# Patient Record
Sex: FEMALE | Race: WHITE | NOT HISPANIC OR LATINO | Employment: FULL TIME | URBAN - METROPOLITAN AREA
[De-identification: names, ages, dates, MRNs, and addresses within clinical notes are randomized per-mention and may not be internally consistent; named-entity substitution may affect disease eponyms.]

---

## 2017-08-26 ENCOUNTER — OFFICE VISIT (OUTPATIENT)
Dept: URGENT CARE | Facility: CLINIC | Age: 32
End: 2017-08-26
Payer: COMMERCIAL

## 2017-08-26 PROCEDURE — 12001 RPR S/N/AX/GEN/TRNK 2.5CM/<: CPT

## 2017-08-26 PROCEDURE — 99203 OFFICE O/P NEW LOW 30 MIN: CPT

## 2019-06-20 ENCOUNTER — OFFICE VISIT (OUTPATIENT)
Dept: URGENT CARE | Facility: CLINIC | Age: 34
End: 2019-06-20
Payer: COMMERCIAL

## 2019-06-20 VITALS
TEMPERATURE: 98.6 F | WEIGHT: 150 LBS | RESPIRATION RATE: 16 BRPM | OXYGEN SATURATION: 99 % | BODY MASS INDEX: 23.54 KG/M2 | HEART RATE: 84 BPM | HEIGHT: 67 IN | SYSTOLIC BLOOD PRESSURE: 108 MMHG | DIASTOLIC BLOOD PRESSURE: 64 MMHG

## 2019-06-20 DIAGNOSIS — L03.031 PARONYCHIA OF GREAT TOE OF RIGHT FOOT: Primary | ICD-10-CM

## 2019-06-20 PROCEDURE — 99213 OFFICE O/P EST LOW 20 MIN: CPT | Performed by: FAMILY MEDICINE

## 2019-06-20 RX ORDER — CEFADROXIL 500 MG/1
500 CAPSULE ORAL EVERY 12 HOURS SCHEDULED
Qty: 20 CAPSULE | Refills: 0 | Status: SHIPPED | OUTPATIENT
Start: 2019-06-20 | End: 2019-06-30

## 2019-06-20 RX ORDER — DESOGESTREL AND ETHINYL ESTRADIOL 21-5 (28)
KIT ORAL
Refills: 0 | COMMUNITY
Start: 2019-05-12 | End: 2020-11-23

## 2019-08-13 ENCOUNTER — OFFICE VISIT (OUTPATIENT)
Dept: URGENT CARE | Facility: CLINIC | Age: 34
End: 2019-08-13
Payer: COMMERCIAL

## 2019-08-13 VITALS
OXYGEN SATURATION: 96 % | SYSTOLIC BLOOD PRESSURE: 122 MMHG | RESPIRATION RATE: 20 BRPM | BODY MASS INDEX: 23.92 KG/M2 | DIASTOLIC BLOOD PRESSURE: 76 MMHG | HEART RATE: 85 BPM | TEMPERATURE: 96.6 F | HEIGHT: 67 IN | WEIGHT: 152.4 LBS

## 2019-08-13 DIAGNOSIS — L23.7 POISON IVY: Primary | ICD-10-CM

## 2019-08-13 PROCEDURE — 99213 OFFICE O/P EST LOW 20 MIN: CPT | Performed by: PREVENTIVE MEDICINE

## 2019-08-13 RX ORDER — METHYLPREDNISOLONE 4 MG/1
TABLET ORAL
Qty: 1 EACH | Refills: 0 | Status: SHIPPED | OUTPATIENT
Start: 2019-08-13 | End: 2020-11-23

## 2019-08-13 NOTE — PROGRESS NOTES
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 and now has developed a papular vesicular oozing rashes on both wrists with small papules on the trunk      Review of Systems   Review of Systems   Skin: Positive for rash  Current Medications       Current Outpatient Medications:     desogestrel-ethinyl estradiol (KARIVA) 0 15-0 02/0 01 MG () per tablet, , Disp: , Rfl: 0    Current Allergies     Allergies as of 2019    (No Known Allergies)            The following portions of the patient's history were reviewed and updated as appropriate: allergies, current medications, past family history, past medical history, past social history, past surgical history and problem list      Past Medical History:   Diagnosis Date    Patient denies medical problems        Past Surgical History:   Procedure Laterality Date    SUPERFICIAL LYMPH NODE BIOPSY / EXCISION      TONSILLECTOMY         No family history on file  Medications have been verified  Objective   /76   Pulse 85   Temp (!) 96 6 °F (35 9 °C)   Resp 20   Ht 5' 7" (1 702 m)   Wt 69 1 kg (152 lb 6 4 oz)   SpO2 96%   BMI 23 87 kg/m²        Physical Exam     Physical Exam   Skin:   Both anterior wrist have erythematous papular vesicular rashes on them that are oozing    Small erythematous papules seen on the trunk

## 2020-02-03 ENCOUNTER — OFFICE VISIT (OUTPATIENT)
Dept: URGENT CARE | Facility: CLINIC | Age: 35
End: 2020-02-03
Payer: COMMERCIAL

## 2020-02-03 VITALS
TEMPERATURE: 97.4 F | WEIGHT: 156 LBS | DIASTOLIC BLOOD PRESSURE: 90 MMHG | RESPIRATION RATE: 20 BRPM | SYSTOLIC BLOOD PRESSURE: 124 MMHG | OXYGEN SATURATION: 96 % | BODY MASS INDEX: 25.07 KG/M2 | HEIGHT: 66 IN | HEART RATE: 95 BPM

## 2020-02-03 DIAGNOSIS — J20.9 ACUTE BRONCHITIS, UNSPECIFIED ORGANISM: Primary | ICD-10-CM

## 2020-02-03 PROCEDURE — 99213 OFFICE O/P EST LOW 20 MIN: CPT | Performed by: FAMILY MEDICINE

## 2020-02-03 RX ORDER — BENZONATATE 100 MG/1
100 CAPSULE ORAL 3 TIMES DAILY PRN
Qty: 20 CAPSULE | Refills: 0 | Status: SHIPPED | OUTPATIENT
Start: 2020-02-03 | End: 2021-01-20 | Stop reason: ALTCHOICE

## 2020-02-03 RX ORDER — AZITHROMYCIN 250 MG/1
TABLET, FILM COATED ORAL
Qty: 6 TABLET | Refills: 0 | Status: SHIPPED | OUTPATIENT
Start: 2020-02-03 | End: 2020-02-07

## 2020-02-03 NOTE — PATIENT INSTRUCTIONS
F/u as needed  Most likely viral   Supportive care  OTC meds as needed  F/u with PCP if no improvement  Symptoms for 2 wks- will try Zpack  Tessalon as needed

## 2020-02-03 NOTE — PROGRESS NOTES
NAME: Hesham Lowe is a 36 y o  female  : 1985    MRN: [id number]      Assessment and Plan   Acute bronchitis, unspecified organism [J20 9]  1  Acute bronchitis, unspecified organism  azithromycin (ZITHROMAX) 250 mg tablet    benzonatate (TESSALON PERLES) 100 mg capsule           Patient Instructions   Patient Instructions   F/u as needed  Most likely viral   Supportive care  OTC meds as needed  F/u with PCP if no improvement  Symptoms for 2 wks- will try Zpack  Tessalon as needed  Proceed to ER if symptoms worsen  Chief Complaint     Chief Complaint   Patient presents with    Cough     patient reports x 2 weeks for 2 days had a sore throat, followed by a productive cough  Denies sore throat at this time  History of Present Illness   Here c/o cough  No fevers  2 wks  SOB with coughing  Recently traveled to [facility] returned on   Theraflu, dayquil, nyquil, mucinex  Tylenol cold/flu  No Tb contacts  Review of Systems   Review of Systems   Constitutional: Negative for fatigue and fever  HENT: Negative for ear pain and trouble swallowing  Respiratory: Positive for cough and shortness of breath  Negative for chest tightness  Cardiovascular: Negative for chest pain  Gastrointestinal: Negative for abdominal pain, diarrhea, nausea and vomiting  Genitourinary: Negative for difficulty urinating and dysuria  Skin: Negative for rash and wound  Neurological: Positive for headaches  Negative for weakness           Current Medications       Current Outpatient Medications:     azithromycin (ZITHROMAX) 250 mg tablet, Take 2 tablets today then 1 tablet daily x 4 days, Disp: 6 tablet, Rfl: 0    benzonatate (TESSALON PERLES) 100 mg capsule, Take 1 capsule (100 mg total) by mouth 3 (three) times a day as needed for cough, Disp: 20 capsule, Rfl: 0    desogestrel-ethinyl estradiol (KARIVA) 0 15-0 02/0 01 MG (21/5) per tablet, , Disp: , Rfl: 0    methylPREDNISolone 4 MG tablet therapy pack, Use as directed on package (Patient not taking: Reported on 2/3/2020), Disp: 1 each, Rfl: 0    Current Allergies     Allergies as of 02/03/2020    (No Known Allergies)              Past Medical History:   Diagnosis Date    Patient denies medical problems        Past Surgical History:   Procedure Laterality Date    SUPERFICIAL LYMPH NODE BIOPSY / EXCISION      TONSILLECTOMY         History reviewed  No pertinent family history  Medications have been verified  The following portions of the patient's history were reviewed and updated as appropriate: allergies, current medications, past family history, past medical history, past social history, past surgical history and problem list     Objective   /90   Pulse 95   Temp (!) 97 4 °F (36 3 °C)   Resp 20   Ht 5' 6" (1 676 m)   Wt 70 8 kg (156 lb)   LMP 01/12/2020   SpO2 96%   BMI 25 18 kg/m²      Physical Exam     Physical Exam   Constitutional: She is oriented to person, place, and time  She appears well-developed and well-nourished  HENT:   Head: Normocephalic  Eyes: EOM are normal    Neck: Normal range of motion  Cardiovascular: Normal rate, regular rhythm and normal heart sounds  Exam reveals no gallop and no friction rub  No murmur heard  Pulmonary/Chest: Effort normal and breath sounds normal  No respiratory distress  She has no wheezes  She has no rales  Abdominal: Soft  Bowel sounds are normal  She exhibits no distension  There is no tenderness  There is no rebound and no guarding  Musculoskeletal: Normal range of motion  Neurological: She is oriented to person, place, and time  Skin: Skin is warm and dry  No rash noted  Psychiatric: She has a normal mood and affect  Thought content normal    Nursing note and vitals reviewed

## 2020-11-06 LAB
EXTERNAL HIV SCREEN: NORMAL
HCV AB SER-ACNC: NON REACTIVE

## 2020-11-09 ENCOUNTER — TRANSCRIBE ORDERS (OUTPATIENT)
Dept: PERINATAL CARE | Facility: CLINIC | Age: 35
End: 2020-11-09

## 2020-11-09 DIAGNOSIS — O09.899 SUPERVISION OF OTHER HIGH RISK PREGNANCIES, UNSPECIFIED TRIMESTER: Primary | ICD-10-CM

## 2020-11-20 ENCOUNTER — TELEPHONE (OUTPATIENT)
Dept: PERINATAL CARE | Facility: OTHER | Age: 35
End: 2020-11-20

## 2020-11-23 ENCOUNTER — ROUTINE PRENATAL (OUTPATIENT)
Dept: PERINATAL CARE | Facility: OTHER | Age: 35
End: 2020-11-23
Payer: COMMERCIAL

## 2020-11-23 VITALS
WEIGHT: 165 LBS | DIASTOLIC BLOOD PRESSURE: 76 MMHG | HEART RATE: 84 BPM | SYSTOLIC BLOOD PRESSURE: 130 MMHG | HEIGHT: 66 IN | BODY MASS INDEX: 26.52 KG/M2

## 2020-11-23 DIAGNOSIS — Z3A.12 12 WEEKS GESTATION OF PREGNANCY: Primary | ICD-10-CM

## 2020-11-23 DIAGNOSIS — Z36.82 ENCOUNTER FOR (NT) NUCHAL TRANSLUCENCY SCAN: ICD-10-CM

## 2020-11-23 DIAGNOSIS — O34.10 UTERINE FIBROID IN PREGNANCY: ICD-10-CM

## 2020-11-23 DIAGNOSIS — D25.9 UTERINE FIBROID IN PREGNANCY: ICD-10-CM

## 2020-11-23 DIAGNOSIS — O09.899 SUPERVISION OF OTHER HIGH RISK PREGNANCIES, UNSPECIFIED TRIMESTER: ICD-10-CM

## 2020-11-23 DIAGNOSIS — O09.511 PRIMIGRAVIDA OF ADVANCED MATERNAL AGE IN FIRST TRIMESTER: ICD-10-CM

## 2020-11-23 PROBLEM — O09.519 ADVANCED MATERNAL AGE, 1ST PREGNANCY: Status: ACTIVE | Noted: 2020-11-23

## 2020-11-23 PROCEDURE — 76813 OB US NUCHAL MEAS 1 GEST: CPT | Performed by: OBSTETRICS & GYNECOLOGY

## 2020-11-23 PROCEDURE — 99241 PR OFFICE CONSULTATION NEW/ESTAB PATIENT 15 MIN: CPT | Performed by: OBSTETRICS & GYNECOLOGY

## 2020-11-23 PROCEDURE — 76801 OB US < 14 WKS SINGLE FETUS: CPT | Performed by: OBSTETRICS & GYNECOLOGY

## 2020-11-23 RX ORDER — ASPIRIN 81 MG/1
162 TABLET ORAL DAILY
Qty: 60 TABLET | Refills: 5 | Status: SHIPPED | OUTPATIENT
Start: 2020-11-23

## 2021-01-19 ENCOUNTER — TELEPHONE (OUTPATIENT)
Dept: PERINATAL CARE | Facility: CLINIC | Age: 36
End: 2021-01-19

## 2021-01-19 NOTE — TELEPHONE ENCOUNTER
-------------------------------------------------------------    Attempted to reach patient by phone and left voicemail to confirm appointment for MFM ultrasound  1 support person ( must be over the age of 15) may accompany you for your appointment  If you or your support person have traveled outside the state in the past 2 weeks, please call and notify our office today #[phone]  You and your support person must wear a mask ,covering nose and mouth,during your entire visit  To minimize your exposure in our waiting room, please call our office prior to entering the building  Check in and rooming questions will be done via phone  We will give you directions when to enter for your appointment  [place]: [phone]    IF you are not feeling well- cough, fever, shortness of breath or any flu like symptoms, contact your primary care physician or [address] [place] [address]  If you are awaiting COVID 19 test results please call and reschedule your appointment    Any questions with these instructions please call Maternal Fetal Medicine nurse line today @ # [phone]

## 2021-01-20 ENCOUNTER — ROUTINE PRENATAL (OUTPATIENT)
Dept: PERINATAL CARE | Facility: CLINIC | Age: 36
End: 2021-01-20
Payer: COMMERCIAL

## 2021-01-20 VITALS
WEIGHT: 174.6 LBS | BODY MASS INDEX: 28.06 KG/M2 | HEIGHT: 66 IN | HEART RATE: 62 BPM | SYSTOLIC BLOOD PRESSURE: 122 MMHG | DIASTOLIC BLOOD PRESSURE: 66 MMHG

## 2021-01-20 DIAGNOSIS — O34.12 UTERINE FIBROIDS AFFECTING PREGNANCY IN SECOND TRIMESTER: ICD-10-CM

## 2021-01-20 DIAGNOSIS — Z3A.20 20 WEEKS GESTATION OF PREGNANCY: ICD-10-CM

## 2021-01-20 DIAGNOSIS — D25.9 UTERINE FIBROIDS AFFECTING PREGNANCY IN SECOND TRIMESTER: ICD-10-CM

## 2021-01-20 DIAGNOSIS — O35.8XX0 FETAL RENAL ANOMALY, SINGLE GESTATION: ICD-10-CM

## 2021-01-20 DIAGNOSIS — O09.511 ELDERLY PRIMIGRAVIDA, FIRST TRIMESTER: Primary | ICD-10-CM

## 2021-01-20 DIAGNOSIS — Z36.86 ENCOUNTER FOR ANTENATAL SCREENING FOR CERVICAL LENGTH: ICD-10-CM

## 2021-01-20 PROBLEM — O35.EXX0 FETAL RENAL ANOMALY, SINGLE GESTATION: Status: ACTIVE | Noted: 2021-01-20

## 2021-01-20 PROCEDURE — 76817 TRANSVAGINAL US OBSTETRIC: CPT | Performed by: OBSTETRICS & GYNECOLOGY

## 2021-01-20 PROCEDURE — 76811 OB US DETAILED SNGL FETUS: CPT | Performed by: OBSTETRICS & GYNECOLOGY

## 2021-01-20 PROCEDURE — 99213 OFFICE O/P EST LOW 20 MIN: CPT | Performed by: OBSTETRICS & GYNECOLOGY

## 2021-01-20 NOTE — PROGRESS NOTES
Please refer to the [facility] ultrasound report in Ob Procedures for additional information regarding today's visit

## 2021-01-20 NOTE — PROGRESS NOTES
Ultrasound Probe Disinfection    A transvaginal ultrasound was performed  Prior to use, disinfection was performed with High Level Disinfection Process (Trophon)  Probe serial number [id number]: [id number] was used        Ct Macario  01/20/21  11:35 AM

## 2021-03-16 PROBLEM — O09.513 ELDERLY PRIMIGRAVIDA, THIRD TRIMESTER: Status: ACTIVE | Noted: 2021-03-16

## 2021-03-16 NOTE — PROGRESS NOTES
Please refer to the [facility] ultrasound report in Ob Procedures for additional information regarding today's visit

## 2021-03-17 ENCOUNTER — ULTRASOUND (OUTPATIENT)
Dept: PERINATAL CARE | Facility: CLINIC | Age: 36
End: 2021-03-17
Payer: COMMERCIAL

## 2021-03-17 VITALS
SYSTOLIC BLOOD PRESSURE: 111 MMHG | BODY MASS INDEX: 30.22 KG/M2 | HEIGHT: 66 IN | WEIGHT: 188 LBS | DIASTOLIC BLOOD PRESSURE: 72 MMHG | HEART RATE: 102 BPM

## 2021-03-17 DIAGNOSIS — O09.513 ELDERLY PRIMIGRAVIDA, THIRD TRIMESTER: ICD-10-CM

## 2021-03-17 DIAGNOSIS — Z3A.28 28 WEEKS GESTATION OF PREGNANCY: ICD-10-CM

## 2021-03-17 DIAGNOSIS — O35.8XX0 FETAL RENAL ANOMALY, SINGLE GESTATION: Primary | ICD-10-CM

## 2021-03-17 PROCEDURE — 99213 OFFICE O/P EST LOW 20 MIN: CPT | Performed by: OBSTETRICS & GYNECOLOGY

## 2021-03-17 PROCEDURE — 76816 OB US FOLLOW-UP PER FETUS: CPT | Performed by: OBSTETRICS & GYNECOLOGY

## 2021-03-17 NOTE — PATIENT INSTRUCTIONS
Kick Counts in Pregnancy   WHAT YOU NEED TO KNOW:   Kick counts measure how much your baby is moving in your womb  A kick from your baby can be felt as a twist, turn, swish, roll, or jab  It is common to feel your baby kicking at 26 to 28 weeks of pregnancy  You may feel your baby kick as early as 20 weeks of pregnancy  You may want to start counting at 28 weeks  DISCHARGE INSTRUCTIONS:   Contact your healthcare provider immediately if:   · You feel a change in the number of kicks or movements of your baby  · You feel fewer than 10 kicks within 2 hours  · You have questions or concerns about your baby's movements  Why measure kick counts:  Your baby's movement may provide information about your baby's health  He or she may move less, or not at all, if there are problems  Your baby may move less if he or she is not getting enough oxygen or nutrition from the placenta  Do not smoke while you are pregnant  Smoking decreases the amount of oxygen that gets to your baby  Talk to your healthcare provider if you need help to quit smoking  Tell your healthcare provider as soon as you feel a change in your baby's movements  When to measure kick counts:   · Measure kick counts at the same time every day  · Measure kick counts when your baby is awake and most active  Your baby may be most active in the evening  How to measure kick counts:  Check that your baby is awake before you measure kick counts  You can wake up your baby by lightly pushing on your belly, walking, or drinking something cold  Your healthcare provider may tell you different ways to measure kick counts  You may be told to do the following:  · Use a chart or clock to keep track of the time you start and finish counting  · Sit in a chair or lie on your left side  · Place your hands on the largest part of your belly  · Count until you reach 10 kicks  Write down how much time it takes to count 10 kicks       · It may take 30 minutes to 2 hours to count 10 kicks  It should not take more than 2 hours to count 10 kicks  Follow up with your healthcare provider as directed:  Write down your questions so you remember to ask them during your visits  © Copyright [address] Information is for End User's use only and may not be sold, redistributed or otherwise used for commercial purposes  All illustrations and images included in CareNotes® are the copyrighted property of A D A M , Inc  or [facility]   The above information is an  only  It is not intended as medical advice for individual conditions or treatments  Talk to your doctor, nurse or pharmacist before following any medical regimen to see if it is safe and effective for you

## 2021-05-12 ENCOUNTER — ULTRASOUND (OUTPATIENT)
Dept: PERINATAL CARE | Facility: CLINIC | Age: 36
End: 2021-05-12
Payer: COMMERCIAL

## 2021-05-12 VITALS
WEIGHT: 208.6 LBS | DIASTOLIC BLOOD PRESSURE: 64 MMHG | BODY MASS INDEX: 33.52 KG/M2 | HEART RATE: 92 BPM | HEIGHT: 66 IN | SYSTOLIC BLOOD PRESSURE: 116 MMHG

## 2021-05-12 DIAGNOSIS — Z3A.36 36 WEEKS GESTATION OF PREGNANCY: ICD-10-CM

## 2021-05-12 DIAGNOSIS — O34.13 UTERINE FIBROIDS AFFECTING PREGNANCY, THIRD TRIMESTER: ICD-10-CM

## 2021-05-12 DIAGNOSIS — O35.8XX0 ENCOUNTER FOR REPEAT ULTRASOUND OF FETAL PYELECTASIS IN SINGLETON PREGNANCY, ANTEPARTUM: Primary | ICD-10-CM

## 2021-05-12 DIAGNOSIS — O09.513 ELDERLY PRIMIGRAVIDA, THIRD TRIMESTER: ICD-10-CM

## 2021-05-12 DIAGNOSIS — D25.9 UTERINE FIBROIDS AFFECTING PREGNANCY, THIRD TRIMESTER: ICD-10-CM

## 2021-05-12 PROCEDURE — 76816 OB US FOLLOW-UP PER FETUS: CPT | Performed by: OBSTETRICS & GYNECOLOGY

## 2021-05-12 PROCEDURE — 99212 OFFICE O/P EST SF 10 MIN: CPT | Performed by: OBSTETRICS & GYNECOLOGY

## 2021-05-12 NOTE — PATIENT INSTRUCTIONS
Kick Counts in Pregnancy   WHAT YOU NEED TO KNOW:   Kick counts measure how much your baby is moving in your womb  A kick from your baby can be felt as a twist, turn, swish, roll, or jab  It is common to feel your baby kicking at 26 to 28 weeks of pregnancy  You may feel your baby kick as early as 20 weeks of pregnancy  You may want to start counting at 28 weeks  DISCHARGE INSTRUCTIONS:   Contact your healthcare provider immediately if:   · You feel a change in the number of kicks or movements of your baby  · You feel fewer than 10 kicks within 2 hours  · You have questions or concerns about your baby's movements  Why measure kick counts:  Your baby's movement may provide information about your baby's health  He or she may move less, or not at all, if there are problems  Your baby may move less if he or she is not getting enough oxygen or nutrition from the placenta  Do not smoke while you are pregnant  Smoking decreases the amount of oxygen that gets to your baby  Talk to your healthcare provider if you need help to quit smoking  Tell your healthcare provider as soon as you feel a change in your baby's movements  When to measure kick counts:   · Measure kick counts at the same time every day  · Measure kick counts when your baby is awake and most active  Your baby may be most active in the evening  How to measure kick counts:  Check that your baby is awake before you measure kick counts  You can wake up your baby by lightly pushing on your belly, walking, or drinking something cold  Your healthcare provider may tell you different ways to measure kick counts  You may be told to do the following:  · Use a chart or clock to keep track of the time you start and finish counting  · Sit in a chair or lie on your left side  · Place your hands on the largest part of your belly  · Count until you reach 10 kicks  Write down how much time it takes to count 10 kicks       · It may take 30 minutes to 2 hours to count 10 kicks  It should not take more than 2 hours to count 10 kicks  Follow up with your healthcare provider as directed:  Write down your questions so you remember to ask them during your visits  © Copyright [address] Information is for End User's use only and may not be sold, redistributed or otherwise used for commercial purposes  All illustrations and images included in CareNotes® are the copyrighted property of A D A M , Inc  or [facility] [place]   The above information is an  only  It is not intended as medical advice for individual conditions or treatments  Talk to your doctor, nurse or pharmacist before following any medical regimen to see if it is safe and effective for you

## 2021-05-12 NOTE — PROGRESS NOTES
Please refer to the [facility] ultrasound report in Ob Procedures for additional information regarding today's visit

## 2021-08-20 ENCOUNTER — TELEPHONE (OUTPATIENT)
Dept: NEUROLOGY | Facility: CLINIC | Age: 36
End: 2021-08-20

## 2021-08-20 NOTE — TELEPHONE ENCOUNTER
Best contact number for patient:     confirmed    Emergency Contact name and number:    Referring provider and telephone number:     Jamal Ortiz Ref over    Primary Care Provider Name and if affiliated with [facility]:     Reason for Appointment/Dx:     HA    Have you seen and followed up with a pediatric Neurologist for this disease in the past?      NO   (If yes ok to schedule with Dr Bailey Lin)    Neurology Location patient would like to be seen:     Flexible  Order received? NO                                                Records Received? Not yet    Have you ever seen another Neurologist?       Not sure    Insurance Information    Insurance Name:    Miky Galindo    ID/Policy #:    Secondary Insurance:    ID/Policy#: Workman's Comp/ Accident/ School  Information      Workman's Comp/Accident/School related?        NO    If yes name of Insurance company:    Claim #:    Date of Injury:    Type of Injury:     Name and Telephone Number:    Notes:                   Appointment date:   Thursday 1/13/22  [address] [facility]  CV    Placed on [employer] for  [employer] (except ES)  At all locations  Lives [place]

## 2021-08-31 ENCOUNTER — CONSULT (OUTPATIENT)
Dept: NEUROLOGY | Facility: CLINIC | Age: 36
End: 2021-08-31
Payer: COMMERCIAL

## 2021-08-31 VITALS
HEART RATE: 83 BPM | WEIGHT: 191 LBS | HEIGHT: 67 IN | SYSTOLIC BLOOD PRESSURE: 133 MMHG | DIASTOLIC BLOOD PRESSURE: 71 MMHG | BODY MASS INDEX: 29.98 KG/M2 | TEMPERATURE: 97.3 F

## 2021-08-31 DIAGNOSIS — G43.109 MIGRAINE WITH AURA AND WITHOUT STATUS MIGRAINOSUS, NOT INTRACTABLE: Primary | ICD-10-CM

## 2021-08-31 DIAGNOSIS — R51.9 PREGNANCY HEADACHE, POSTPARTUM: ICD-10-CM

## 2021-08-31 PROCEDURE — 99205 OFFICE O/P NEW HI 60 MIN: CPT | Performed by: PSYCHIATRY & NEUROLOGY

## 2021-08-31 NOTE — PROGRESS NOTES
Patient ID: Luna Hendrix is a 29 y o  female  Assessment/Plan:   Patient Instructions   Postpartum headache syndrome:  Willie Eduardo presents for an initial consultation with regard to a headache syndrome which began just after the birth of her 1st child approximately 12 weeks ago  At this point she continues to have a persistent migraine syndrome with aura  She has some minimal hearing changes on our examination in the office today as well as some patchy sensory changes in the distal bilateral lower extremity and some asymmetric reflexes  Ultimately she does have a remote history of migraine and it is possible that this represents a migraine syndrome however there are several potential causes for peripartum headache which need to be ruled out with appropriate imaging in an urgent fashion     -we have requested a stat MRI of the brain with and without contrast along with an MRV and MRA of the head  - she is scheduled for her imaging at 1100 hours tomorrow which is quite reasonable     -going on the premise that the imaging does not reveal any clear secondary cause for headache we will plan to initiate a migraine multivitamin  She should look for a vitamin supplement that contains 1 or more of the following ingredients ( magnesium, riboflavin/vitamin B2, feverfew, coenzyme Q 10)  She should use this according to package instructions  - I would like for her to remain well hydrated drinking at least 48-64 oz of non caffeinated beverages per day  I would also like for her to try and get adequate sleep  - depending on the results of her imaging we have several appropriate options with regard to abortive treatment  We also discussed a few additional options in terms of prevention including topiramate and amitriptyline    We will call her with the results of her MRI as soon as they are available and plan for appropriate initiation of treatment at that time   - I would like her to keep track of her migraines using application on her phone     I will plan for her to follow up with us in 3 months time but would be happy to see her sooner if the need should arise  No problem-specific Assessment & Plan notes found for this encounter  Diagnoses and all orders for this visit:    Migraine with aura and without status migrainosus, not intractable  -     MRI brain with and without contrast; Future  -     MRV head wo contrast; Future  -     MRA head w contrast; Future    Pregnancy headache, postpartum  -     MRI brain with and without contrast; Future  -     MRV head wo contrast; Future  -     MRA head w contrast; Future           Subjective: Attestation signed by Tommie Alvarez MD at 8/31/2021 10:00 PM   I confirmed key aspects of the history as taken and documented by [facility] and was present for the exam   I agree with the documentation with my modifications below  HPI: Migraine     Hiral Francois is a 29 y o  female who presents for evaluation of headache  Symptoms began about 3 months ago immediately following the delivery of her first child  She reports losing ~950 cc's of blood during delivery  Generally, the headaches last about 1 hour and occur 1-3 times per week  She reports the headaches got better following an iron supplement her PCP put her on after delivery and subsequent return of severity upon cessation of the supplements 6 weeks ago  Since then, the headaches have been decreasing in frequency with consistent severity      The headaches are usually worse in the morning  The headaches are usually throbbing and are located periorbitally and bilaterally  The patient rates her most severe headaches a 8 on a scale from 1 to 10  Work attendance or other daily activities are not affected by the headaches   There have been no precipitating factors identified at this time       The headaches are usually preceded by an aura consisting of blurry vision 15-20 minutes immediately followed by migraine  She described the blurry vision as wavy inconsistencies of objects  Blurred vision never occurs without subsequent migraine  Associated neurologic symptoms: loss of balance, numbness of extremities and vision problems  The patient denies nausea/vomitting  Home treatment has included Excedrin and Tylenol with little improvement       Other history includes: migraine headaches diagnosed in the past ([id number])  Those migraines were worse in severity and were accompanied by loss of vision  Patient reports she was treated with IM Sumatriptan  Family history includes no known family members with significant headaches      The following portions of the patient's history were reviewed and updated as appropriate: allergies, past family history, past medical history, past social history and problem list                          Objective:    Blood pressure 133/71, pulse 83, temperature (!) 97 3 °F (36 3 °C), height 5' 7" (1 702 m), weight 86 6 kg (191 lb), unknown if currently breastfeeding  Physical Exam      Cranial Nerves:   I: smell Not tested   II: visual acuity  OS: n/a   OD: n/a   II: visual fields Full to confrontation   II: pupils Equal, round, reactive to light   III,VII: ptosis None   III,IV,VI: extraocular muscles  Full ROM   V: mastication n/a   V: facial light touch sensation  Normal   V,VII: corneal reflex  n/a   VII: facial muscle function - upper  Normal   VII: facial muscle function - lower Normal   VIII: hearing Diminished on L side, followed up with normal Chapman-Rine exam   IX: soft palate elevation  Normal   IX,X: gag reflex n/a   XI: trapezius strength  5/5   XI: sternocleidomastoid strength 5/5   XI: neck flexion strength  n/a   XII: tongue strength  Normal         Motor:  - There is no pronator drift of out-stretched arms  Muscle bulk and tone are normal  Strength is full bilaterally    - Deltoid: 5/5  - Biceps: 5/5  - Triceps: 5/5  - Wrist extension/Flexion: 5/5  - Finger abduction: 5/5  - Hip Flexion: 5/5  - Hip extension: 5/5  - Knee extension: 5/5  - Ankle Flexion: 5/5  - Ankle Extension: 5/5     Reflexes:  - Biceps: 2+ bilaterally  - Triceps: 2+ bilaterally  - Patellar: 1+ R, absent on L  - Achilles: 1+ R, absent on L     Sensory:  Light touch and vibration sense are intact in fingers and toes  Pinprick sensation slightly diminished on medial dorsal foot bilaterally      Coordination:  Rapid alternating movements and fine finger movements are intact  There is no dysmetria on finger-to-nose  There are no abnormal or extraneous movements  With eyes closed proprioception is in tact       Gait/Stance:  Posture is normal  Gait is steady with normal steps, base, arm swing, and turning  Heel and toe walking are normal      No papilledema on fundoscopy  ROS:    Review of Systems   Constitutional: Negative  Negative for appetite change and fever  HENT: Negative  Negative for hearing loss, tinnitus, trouble swallowing and voice change  Eyes: Positive for visual disturbance  Negative for photophobia and pain  Vision gets very blurry prior to onset of headache   Respiratory: Negative  Negative for shortness of breath  Cardiovascular: Negative  Negative for palpitations  Gastrointestinal: Negative  Negative for nausea and vomiting  Endocrine: Negative  Negative for cold intolerance  Genitourinary: Negative  Negative for dysuria, frequency and urgency  Musculoskeletal: Negative  Negative for myalgias and neck pain  Skin: Negative  Negative for rash  Neurological: Positive for dizziness, light-headedness and headaches  Negative for tremors, seizures, syncope, facial asymmetry, speech difficulty, weakness and numbness  Had headache this morning, sporadic schedule of headaches they come at random times  Usual pain level is 7 out of 10  Suffers dizziness and light headedness when she has headache   Hematological: Negative    Does not bruise/bleed easily  Psychiatric/Behavioral: Negative  Negative for confusion, hallucinations and sleep disturbance  All other systems reviewed and are negative  Reviewed ROS as entered by medical assistant

## 2021-08-31 NOTE — PATIENT INSTRUCTIONS
Postpartum headache syndrome:  Adrián Guy presents for an initial consultation with regard to a headache syndrome which began just after the birth of her 1st child approximately 12 weeks ago  At this point she continues to have a persistent migraine syndrome with aura  She has some minimal hearing changes on our examination in the office today as well as some patchy sensory changes in the distal bilateral lower extremity and some asymmetric reflexes  Ultimately she does have a remote history of migraine and it is possible that this represents a migraine syndrome however there are several potential causes for peripartum headache which need to be ruled out with appropriate imaging in an urgent fashion     -we have requested a stat MRI of the brain with and without contrast along with an MRV and MRA of the head  - she is scheduled for her imaging at 1100 hours tomorrow which is quite reasonable     -going on the premise that the imaging does not reveal any clear secondary cause for headache we will plan to initiate a migraine multivitamin  She should look for a vitamin supplement that contains 1 or more of the following ingredients ( magnesium, riboflavin/vitamin B2, feverfew, coenzyme Q 10)  She should use this according to package instructions  - I would like for her to remain well hydrated drinking at least 48-64 oz of non caffeinated beverages per day  I would also like for her to try and get adequate sleep  - depending on the results of her imaging we have several appropriate options with regard to abortive treatment  We also discussed a few additional options in terms of prevention including topiramate and amitriptyline    We will call her with the results of her MRI as soon as they are available and plan for appropriate initiation of treatment at that time   - I would like her to keep track of her migraines using application on her phone     I will plan for her to follow up with us in 3 months time but would be happy to see her sooner if the need should arise

## 2021-08-31 NOTE — PROGRESS NOTES
HPI: Migraine    James Calderon is a 29 y o  female who presents for evaluation of headache  Symptoms began about 3 months ago immediately following the delivery of her first child  She reports losing ~950 cc's of blood during delivery  Generally, the headaches last about 1 hour and occur 1-3 times per week  She reports the headaches got better following an iron supplement her PCP put her on after delivery and subsequent return of severity upon cessation of the supplements 6 weeks ago  Since then, the headaches have been decreasing in frequency with consistent severity  The headaches are usually worse in the morning  The headaches are usually throbbing and are located periorbitally and bilaterally  The patient rates her most severe headaches a 8 on a scale from 1 to 10  Work attendance or other daily activities are not affected by the headaches  There have been no precipitating factors identified at this time  The headaches are usually preceded by an aura consisting of blurry vision 15-20 minutes immediately followed by migraine  She described the blurry vision as wavy inconsistencies of objects  Blurred vision never occurs without subsequent migraine  Associated neurologic symptoms: loss of balance, numbness of extremities and vision problems  The patient denies nausea/vomitting  Home treatment has included Excedrin and Tylenol with little improvement  Other history includes: migraine headaches diagnosed in the past ([id number])  Those migraines were worse in severity and were accompanied by loss of vision  Patient reports she was treated with IM Sumatriptan  Family history includes no known family members with significant headaches  The following portions of the patient's history were reviewed and updated as appropriate: allergies, past family history, past medical history, past social history and problem list     Review of Systems  Pertinent items are noted in HPI       Objective     /71 Pulse 83   Temp (!) 97 3 °F (36 3 °C)   Ht 5' 7" (1 702 m)   Wt 86 6 kg (191 lb)   BMI 29 91 kg/m²     Physical Exam:    Cranial Nerves:   I: smell Not tested   II: visual acuity  OS: n/a   OD: n/a   II: visual fields Full to confrontation   II: pupils Equal, round, reactive to light   III,VII: ptosis None   III,IV,VI: extraocular muscles  Full ROM   V: mastication n/a   V: facial light touch sensation  Normal   V,VII: corneal reflex  n/a   VII: facial muscle function - upper  Normal   VII: facial muscle function - lower Normal   VIII: hearing Diminished on L side, followed up with normal Chapman-Rine exam   IX: soft palate elevation  Normal   IX,X: gag reflex n/a   XI: trapezius strength  5/5   XI: sternocleidomastoid strength 5/5   XI: neck flexion strength  n/a   XII: tongue strength  Normal       Motor:  - There is no pronator drift of out-stretched arms  Muscle bulk and tone are normal  Strength is full bilaterally  - Deltoid: 5/5  - Biceps: 5/5  - Triceps: 5/5  - Wrist extension/Flexion: 5/5  - Finger abduction: 5/5  - Hip Flexion: 5/5  - Hip extension: 5/5  - Knee extension: 5/5  - Ankle Flexion: 5/5  - Ankle Extension: 5/5    Reflexes:  - Biceps: 2+ bilaterally  - Triceps: 2+ bilaterally  - Patellar: 1+ R, absent on L  - Achilles: 1+ R, absent on L    Sensory:  Light touch and vibration sense are intact in fingers and toes  Pinprick sensation slightly diminished on medial dorsal foot bilaterally  Coordination:  Rapid alternating movements and fine finger movements are intact  There is no dysmetria on finger-to-nose  There are no abnormal or extraneous movements  With eyes closed proprioception is in tact  Gait/Stance:  Posture is normal  Gait is steady with normal steps, base, arm swing, and turning  Heel and toe walking are normal      Assesment/Plan  Tatiana Farr is a 29year old woman with acute onset of a post partum migraine syndrome   DDx including PRESS, Idiopathic Intracranial Hypotension, Cerebral-Venous Sinuous thrombosis, and RCVS, less likely thought to be related to post dural puncture headache      - STAT MRI, MVA ordered for 11:15am tomorrow to rule out acute bleed, swelling, or infarct  - Pt advised to  Migraine prophylactic multivitamin containing at least one of the following (Mag, B2, Coenzyme Q10)   - Pt will log migraines in diary daily  - If multivitamins are not successful in providing proper relief, will consider starting topamax

## 2021-09-01 ENCOUNTER — HOSPITAL ENCOUNTER (OUTPATIENT)
Dept: MRI IMAGING | Facility: HOSPITAL | Age: 36
Discharge: HOME | End: 2021-09-01
Attending: PSYCHIATRY & NEUROLOGY
Payer: COMMERCIAL

## 2021-09-01 ENCOUNTER — TELEPHONE (OUTPATIENT)
Dept: NEUROLOGY | Facility: CLINIC | Age: 36
End: 2021-09-01

## 2021-09-01 DIAGNOSIS — G43.109 MIGRAINE WITH AURA AND WITHOUT STATUS MIGRAINOSUS, NOT INTRACTABLE: ICD-10-CM

## 2021-09-01 DIAGNOSIS — R51.9 PREGNANCY HEADACHE, POSTPARTUM: ICD-10-CM

## 2021-09-01 PROCEDURE — G1004 CDSM NDSC: HCPCS

## 2021-09-01 PROCEDURE — A9585 GADOBUTROL INJECTION: HCPCS | Performed by: PSYCHIATRY & NEUROLOGY

## 2021-09-01 PROCEDURE — 70553 MRI BRAIN STEM W/O & W/DYE: CPT

## 2021-09-01 RX ADMIN — GADOBUTROL 8 ML: 604.72 INJECTION INTRAVENOUS at 12:18

## 2021-09-01 NOTE — TELEPHONE ENCOUNTER
----- Message from Olivier Alejandre sent at 8/31/2021  4:49 PM EDT -----  I checked out Patient and gave her the orders and her AVS  She will need F/U appt in 3mos I believe

## 2021-09-03 ENCOUNTER — TELEPHONE (OUTPATIENT)
Dept: NEUROLOGY | Facility: CLINIC | Age: 36
End: 2021-09-03

## 2021-09-03 NOTE — TELEPHONE ENCOUNTER
----- Message from Katt Rg MD sent at 9/3/2021  9:05 AM EDT -----  Please give Bri a call  The MRI/A/V look good  No significant structural abnormalities or issues with the arteries or veins  Bearing that in mind we can offer treatment for the migraines themselves    I would probably start with Imitrex and Ibuprofen in combination in terms of treatment for then they come on, and she decided to go with the vitamin regimen for prevention at our last visit    pleae verify with her that she has gotten or is getting the vitamins and see if she is amenable to the imitrex and if so I will send the rx  Thanks

## 2021-09-03 NOTE — RESULT ENCOUNTER NOTE
Please give Bri a call  The MRI/A/V look good  No significant structural abnormalities or issues with the arteries or veins  Bearing that in mind we can offer treatment for the migraines themselves    I would probably start with Imitrex and Ibuprofen in combination in terms of treatment for then they come on, and she decided to go with the vitamin regimen for prevention at our last visit    pleae verify with her that she has gotten or is getting the vitamins and see if she is amenable to the imitrex and if so I will send the rx  Thanks

## 2021-09-07 NOTE — TELEPHONE ENCOUNTER
Pt called and advised pt of all of the below  She verbalized clear understanding  Pt states that she started vitamin regimen today  She will give it a couple of weeks to see if it helps her migraine  If not, she will call us back to request script for imitrex

## 2021-11-29 ENCOUNTER — TELEPHONE (OUTPATIENT)
Dept: NEUROLOGY | Facility: CLINIC | Age: 36
End: 2021-11-29

## 2022-01-03 ENCOUNTER — TELEPHONE (OUTPATIENT)
Dept: NEUROLOGY | Facility: CLINIC | Age: 37
End: 2022-01-03

## 2022-05-20 ENCOUNTER — ROUTINE PRENATAL (OUTPATIENT)
Dept: PERINATAL CARE | Facility: OTHER | Age: 37
End: 2022-05-20
Payer: COMMERCIAL

## 2022-05-20 VITALS
SYSTOLIC BLOOD PRESSURE: 122 MMHG | DIASTOLIC BLOOD PRESSURE: 65 MMHG | BODY MASS INDEX: 28.47 KG/M2 | WEIGHT: 181.4 LBS | HEIGHT: 67 IN | HEART RATE: 84 BPM

## 2022-05-20 DIAGNOSIS — Z98.891 HISTORY OF CESAREAN SECTION: ICD-10-CM

## 2022-05-20 DIAGNOSIS — O09.521 ELDERLY MULTIGRAVIDA, FIRST TRIMESTER: Primary | ICD-10-CM

## 2022-05-20 DIAGNOSIS — Z3A.13 13 WEEKS GESTATION OF PREGNANCY: ICD-10-CM

## 2022-05-20 DIAGNOSIS — O09.891 SHORT INTERVAL BETWEEN PREGNANCIES COMPLICATING PREGNANCY, ANTEPARTUM, FIRST TRIMESTER: ICD-10-CM

## 2022-05-20 PROBLEM — D25.9 UTERINE FIBROID COMPLICATING ANTENATAL CARE, BABY NOT YET DELIVERED IN FIRST TRIMESTER: Status: ACTIVE | Noted: 2022-05-20

## 2022-05-20 PROBLEM — O34.11 UTERINE FIBROID COMPLICATING ANTENATAL CARE, BABY NOT YET DELIVERED IN FIRST TRIMESTER: Status: ACTIVE | Noted: 2022-05-20

## 2022-05-20 PROCEDURE — 76813 OB US NUCHAL MEAS 1 GEST: CPT | Performed by: OBSTETRICS & GYNECOLOGY

## 2022-05-20 PROCEDURE — 99212 OFFICE O/P EST SF 10 MIN: CPT | Performed by: OBSTETRICS & GYNECOLOGY

## 2022-05-20 PROCEDURE — 76801 OB US < 14 WKS SINGLE FETUS: CPT | Performed by: OBSTETRICS & GYNECOLOGY

## 2022-05-20 RX ORDER — SWAB
SWAB, NON-MEDICATED MISCELLANEOUS EVERY 24 HOURS
COMMUNITY

## 2022-07-11 ENCOUNTER — ROUTINE PRENATAL (OUTPATIENT)
Dept: PERINATAL CARE | Facility: OTHER | Age: 37
End: 2022-07-11
Payer: COMMERCIAL

## 2022-07-11 VITALS
DIASTOLIC BLOOD PRESSURE: 67 MMHG | WEIGHT: 187.8 LBS | HEART RATE: 80 BPM | SYSTOLIC BLOOD PRESSURE: 119 MMHG | BODY MASS INDEX: 29.47 KG/M2 | HEIGHT: 67 IN

## 2022-07-11 DIAGNOSIS — O09.892 SHORT INTERVAL BETWEEN PREGNANCIES COMPLICATING PREGNANCY, ANTEPARTUM, SECOND TRIMESTER: ICD-10-CM

## 2022-07-11 DIAGNOSIS — O44.02 PLACENTA PREVIA IN SECOND TRIMESTER: Primary | ICD-10-CM

## 2022-07-11 DIAGNOSIS — O35.2XX9 HEREDITARY DISEASE IN FAMILY POSSIBLY AFFECTING FETUS, OTHER FETUS: ICD-10-CM

## 2022-07-11 DIAGNOSIS — Z3A.20 20 WEEKS GESTATION OF PREGNANCY: ICD-10-CM

## 2022-07-11 DIAGNOSIS — O09.522 MULTIGRAVIDA OF ADVANCED MATERNAL AGE IN SECOND TRIMESTER: ICD-10-CM

## 2022-07-11 DIAGNOSIS — Z36.86 ENCOUNTER FOR ANTENATAL SCREENING FOR CERVICAL LENGTH: ICD-10-CM

## 2022-07-11 DIAGNOSIS — O34.219 PREGNANCY WITH HISTORY OF CESAREAN SECTION, ANTEPARTUM: ICD-10-CM

## 2022-07-11 PROCEDURE — 76811 OB US DETAILED SNGL FETUS: CPT | Performed by: OBSTETRICS & GYNECOLOGY

## 2022-07-11 PROCEDURE — 76817 TRANSVAGINAL US OBSTETRIC: CPT | Performed by: OBSTETRICS & GYNECOLOGY

## 2022-07-11 PROCEDURE — 99213 OFFICE O/P EST LOW 20 MIN: CPT | Performed by: OBSTETRICS & GYNECOLOGY

## 2022-07-11 NOTE — LETTER
2022     Justine Michaud MD  [address] [facility] [address]    Patient: Jules Sandra   YOB: 1985   Date of Visit: 2022       Dear Dr Anitha Pooel: Thank you for referring Jules Sandra to me for evaluation  Below are my notes for this consultation  If you have questions, please do not hesitate to call me  I look forward to following your patient along with you  Sincerely,        Julee Vela MD        CC: No Recipients  Julee Vela MD  2022 10:44 AM  Sign when Signing Visit  Jules Sandra has no complaints today  She reports regular fetal movements and does not report any problems  She is here today at [facility] for an ultrasound for anatomy  Problem list:  1  History of prior [id number] is planning on a repeat   2  Advanced maternal age-she reports she had normal NIPT and MSAFP through her OB office  3  History of prior child followed by Pediatric Urology for urinary tract dilation  4  Last pregnancy she had a 3 cm left anterior intramural fibroid reported at 12 weeks  I reviewed those pictures and her 20 week US in her last pregnancy where it was no longer visualized suggesting it was just an isolated rodney hics uterine contraction that resembled a possible fibroid  Ultrasound findings: The ultrasound today shows normal interval fetal growth and fluid  No malformations are detected but we could not complete the anatomical survey secondary to fetal position  The fetal kidneys appear normal   The placenta is a posterior previa  There are no fibroids seen  Pregnancy ultrasound has limitations and is unable to detect all forms of fetal congenital abnormalities  The inaccuracy in the EFW can be off by 1 lb either way in the third trimester  Specific counseling was provided on the following problems:  1   Posterior Placenta previa is noted on today's ultrasound that likely will resolve over time based on placental location  There is no evidence for an accreta noted  Recommend pelvic rest until her placenta location improves  Follow up recommended:   1  Recommend a 32 week growth scan along with review of the missed anatomy and placental location  Pre visit time reviewing her records   5 minutes  Face to face time 5 minutes  Post visit time on documentation of note, updating her problem list, adding orders and prescriptions 5 minutes  Procedures that were completed today were charged separately  The level of decision making was straight forward  MD Nayeli Looney  7/11/2022  8:52 AM  Sign when Signing Visit  Ultrasound Probe Disinfection    A transvaginal ultrasound was performed  Prior to use, disinfection was performed with High Level Disinfection Process (Trophon)  Probe serial number U2: [id number] was used        Nayeli Porras  07/11/22  8:51 AM

## 2022-07-11 NOTE — PROGRESS NOTES
Ultrasound Probe Disinfection    A transvaginal ultrasound was performed  Prior to use, disinfection was performed with High Level Disinfection Process (Trophon)  Probe serial number U2: [id number] was used        Chelsea Wakefield  07/11/22  8:51 AM

## 2022-07-11 NOTE — PROGRESS NOTES
Crissy Dsouza has no complaints today  She reports regular fetal movements and does not report any problems  She is here today at [facility] for an ultrasound for anatomy  Problem list:  1  History of prior [id number] is planning on a repeat   2  Advanced maternal age-she reports she had normal NIPT and MSAFP through her OB office  3  History of prior child followed by Pediatric Urology for urinary tract dilation  4  Last pregnancy she had a 3 cm left anterior intramural fibroid reported at 12 weeks  I reviewed those pictures and her 20 week US in her last pregnancy where it was no longer visualized suggesting it was just an isolated rodney hics uterine contraction that resembled a possible fibroid  Ultrasound findings: The ultrasound today shows normal interval fetal growth and fluid  No malformations are detected but we could not complete the anatomical survey secondary to fetal position  The fetal kidneys appear normal   The placenta is a posterior previa  There are no fibroids seen  Pregnancy ultrasound has limitations and is unable to detect all forms of fetal congenital abnormalities  The inaccuracy in the EFW can be off by 1 lb either way in the third trimester  Specific counseling was provided on the following problems:  1  Posterior Placenta previa is noted on today's ultrasound that likely will resolve over time based on placental location  There is no evidence for an accreta noted  Recommend pelvic rest until her placenta location improves  Follow up recommended:   1  Recommend a 32 week growth scan along with review of the missed anatomy and placental location  Pre visit time reviewing her records   5 minutes  Face to face time 5 minutes  Post visit time on documentation of note, updating her problem list, adding orders and prescriptions 5 minutes  Procedures that were completed today were charged separately     The level of decision making was straight forward      Tamera Robles MD

## 2022-07-12 PROBLEM — O44.02 PLACENTA PREVIA IN SECOND TRIMESTER: Status: ACTIVE | Noted: 2022-07-12

## 2022-07-12 PROBLEM — O09.892 SHORT INTERVAL BETWEEN PREGNANCIES COMPLICATING PREGNANCY, ANTEPARTUM, SECOND TRIMESTER: Status: ACTIVE | Noted: 2022-05-20

## 2022-10-03 ENCOUNTER — ULTRASOUND (OUTPATIENT)
Dept: PERINATAL CARE | Facility: OTHER | Age: 37
End: 2022-10-03
Payer: COMMERCIAL

## 2022-10-03 VITALS
WEIGHT: 198.8 LBS | DIASTOLIC BLOOD PRESSURE: 72 MMHG | SYSTOLIC BLOOD PRESSURE: 128 MMHG | HEIGHT: 67 IN | BODY MASS INDEX: 31.2 KG/M2 | HEART RATE: 90 BPM

## 2022-10-03 DIAGNOSIS — O09.893 SHORT INTERVAL BETWEEN PREGNANCIES AFFECTING PREGNANCY IN THIRD TRIMESTER, ANTEPARTUM: ICD-10-CM

## 2022-10-03 DIAGNOSIS — O34.219 PREGNANCY WITH HISTORY OF CESAREAN SECTION, ANTEPARTUM: ICD-10-CM

## 2022-10-03 DIAGNOSIS — Z03.72 SUSPECTED PROBLEM WITH PLACENTA NOT FOUND: Primary | ICD-10-CM

## 2022-10-03 DIAGNOSIS — O35.2XX9 HEREDITARY DISEASE IN FAMILY POSSIBLY AFFECTING FETUS, OTHER FETUS: ICD-10-CM

## 2022-10-03 DIAGNOSIS — O09.523 MULTIGRAVIDA OF ADVANCED MATERNAL AGE IN THIRD TRIMESTER: ICD-10-CM

## 2022-10-03 DIAGNOSIS — Z3A.32 32 WEEKS GESTATION OF PREGNANCY: ICD-10-CM

## 2022-10-03 PROCEDURE — 99213 OFFICE O/P EST LOW 20 MIN: CPT | Performed by: OBSTETRICS & GYNECOLOGY

## 2022-10-03 PROCEDURE — 76817 TRANSVAGINAL US OBSTETRIC: CPT | Performed by: OBSTETRICS & GYNECOLOGY

## 2022-10-03 PROCEDURE — 76816 OB US FOLLOW-UP PER FETUS: CPT | Performed by: OBSTETRICS & GYNECOLOGY

## 2022-10-03 NOTE — LETTER
October 3, 2022     Cihlo Galeano MD  [address] [facility]     Patient: Perri Graves   YOB: 1985   Date of Visit: 10/3/2022       Dear Dr Judge Pastor: Thank you for referring Perri Graves to me for evaluation  Below are my notes for this consultation  If you have questions, please do not hesitate to call me  I look forward to following your patient along with you  Sincerely,        Edward Givens MD        CC: No Recipients  Edward Givens MD  10/3/2022  9:07 PM  Sign when Signing Visit  Perri Graves has no complaints today  She reports regular fetal movements and does not report any problems  She is here today at 32w5d for an ultrasound for growth and missed anatomy    Problem list:  1  Advanced maternal age  3  Prior  and is planning on a repeat   3  Short interval pregnancy  4  Family history of renal anomalies in a prior child  5  Placenta previa    Ultrasound findings: The ultrasound today shows normal interval fetal growth and fluid  The placenta is no longer a previa  Prior missed anatomy was reviewed and appears normal thus completing her anatomical survey  Pregnancy ultrasound has limitations and is unable to detect all forms of fetal congenital abnormalities  The inaccuracy in the EFW can be off by 1 lb either way in the third trimester  Follow up recommended:   1  No further growth scans are recommended at this time  2  Pelvic rest is no longer recommended    Pre visit time reviewing her records   5 minutes  Face to face time 10 minutes  Post visit time on documentation of note, updating her problem list, adding orders and prescriptions 5 minutes  Procedures that were completed today were charged separately  The level of decision making was low level complexity      Edward Givens

## 2022-10-03 NOTE — PROGRESS NOTES
Ultrasound Probe Disinfection    A transvaginal ultrasound was performed  Prior to use, disinfection was performed with High Level Disinfection Process (Trophon)  Probe serial number U2: [id number] was used        Ct Macario  10/03/22  9:26 AM

## 2022-10-04 NOTE — PROGRESS NOTES
Perri Graves has no complaints today  She reports regular fetal movements and does not report any problems  She is here today at 32w5d for an ultrasound for growth and missed anatomy    Problem list:  1  Advanced maternal age  3  Prior  and is planning on a repeat   3  Short interval pregnancy  4  Family history of renal anomalies in a prior child  5  Placenta previa    Ultrasound findings: The ultrasound today shows normal interval fetal growth and fluid  The placenta is no longer a previa  Prior missed anatomy was reviewed and appears normal thus completing her anatomical survey  Pregnancy ultrasound has limitations and is unable to detect all forms of fetal congenital abnormalities  The inaccuracy in the EFW can be off by 1 lb either way in the third trimester  Follow up recommended:   1  No further growth scans are recommended at this time  2  Pelvic rest is no longer recommended    Pre visit time reviewing her records   5 minutes  Face to face time 10 minutes  Post visit time on documentation of note, updating her problem list, adding orders and prescriptions 5 minutes  Procedures that were completed today were charged separately  The level of decision making was low level complexity      Allyne Commons

## 2023-06-01 ENCOUNTER — HOSPITAL ENCOUNTER (OUTPATIENT)
Dept: RADIOLOGY | Facility: HOSPITAL | Age: 38
End: 2023-06-01
Payer: COMMERCIAL

## 2023-06-01 DIAGNOSIS — M79.671 RIGHT FOOT PAIN: ICD-10-CM

## 2023-06-01 DIAGNOSIS — M77.41 METATARSALGIA OF RIGHT FOOT: ICD-10-CM

## 2023-06-01 DIAGNOSIS — M72.2 PLANTAR FASCIAL FIBROMATOSIS: ICD-10-CM

## 2023-06-01 PROCEDURE — 73630 X-RAY EXAM OF FOOT: CPT

## 2023-07-20 ENCOUNTER — OFFICE VISIT (OUTPATIENT)
Dept: URGENT CARE | Facility: CLINIC | Age: 38
End: 2023-07-20
Payer: COMMERCIAL

## 2023-07-20 VITALS
WEIGHT: 168.8 LBS | BODY MASS INDEX: 27.13 KG/M2 | SYSTOLIC BLOOD PRESSURE: 110 MMHG | HEIGHT: 66 IN | OXYGEN SATURATION: 99 % | TEMPERATURE: 97.1 F | RESPIRATION RATE: 16 BRPM | DIASTOLIC BLOOD PRESSURE: 63 MMHG | HEART RATE: 86 BPM

## 2023-07-20 DIAGNOSIS — S40.861A INSECT BITE OF RIGHT UPPER EXTREMITY, INITIAL ENCOUNTER: Primary | ICD-10-CM

## 2023-07-20 DIAGNOSIS — L03.113 CELLULITIS OF RIGHT UPPER EXTREMITY: ICD-10-CM
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
## 2023-07-20 PROCEDURE — 99213 OFFICE O/P EST LOW 20 MIN: CPT | Performed by: PHYSICIAN ASSISTANT

## 2023-07-20 RX ORDER — CLINDAMYCIN HYDROCHLORIDE 300 MG/1
300 CAPSULE ORAL 4 TIMES DAILY
Qty: 40 CAPSULE | Refills: 0 | Status: SHIPPED | OUTPATIENT
Start: 2023-07-20 | End: 2023-07-30

## 2023-07-20 RX ORDER — PREDNISONE 10 MG/1
TABLET ORAL
Qty: 20 TABLET | Refills: 0 | Status: SHIPPED | OUTPATIENT
Start: 2023-07-20

## 2023-07-20 NOTE — PROGRESS NOTES
[facility]        NAME: Steve Hyman is a 41 y.o. female  : 1985    MRN: [id number]  DATE: 2023  TIME: 2:54 PM    /63   Pulse 86   Temp (!) 97.1 °F (36.2 °C)   Resp 16   Ht 5' 6" (1.676 m)   Wt 76.6 kg (168 lb 12.8 oz)   SpO2 99%   BMI 27.25 kg/m²     Assessment and Plan   Insect bite of right upper extremity, initial encounter [G57.411R, W57. XXXA]  1. Insect bite of right upper extremity, initial encounter  clindamycin (CLEOCIN) 300 MG capsule    predniSONE 10 mg tablet      2. Cellulitis of right upper extremity  clindamycin (CLEOCIN) 300 MG capsule    predniSONE 10 mg tablet            Patient Instructions       Follow up with PCP in 3-5 days. Proceed to  ER if symptoms worsen. Chief Complaint     Chief Complaint   Patient presents with   • Allergic Reaction     Pt states she got stung by a wasp and Tuesday she was stung by a mosquito. Pt states that the wasp sting is dark purple on her left butt cheek. And the mosquito bite just keeps getting bigger and bigger and it hurts. Pt states she took benadryl last dose was around 6am this morning. History of Present Illness       Pt with right upper arm and left buttocks insect bite red warm and tender for a few days, pt taking benadryl       Review of Systems   Review of Systems   Constitutional: Negative. HENT: Negative. Eyes: Negative. Respiratory: Negative. Cardiovascular: Negative. Gastrointestinal: Negative. Endocrine: Negative. Genitourinary: Negative. Musculoskeletal: Negative. Skin: Positive for rash. Allergic/Immunologic: Negative. Neurological: Negative. Hematological: Negative. All other systems reviewed and are negative.         Current Medications       Current Outpatient Medications:   •  clindamycin (CLEOCIN) 300 MG capsule, Take 1 capsule (300 mg total) by mouth 4 (four) times a day for 10 days, Disp: 40 capsule, Rfl: 0  •  predniSONE 10 mg tablet, 4 tabs po qd x 2 days then 3 tabs po qd x 2 days then 2 tabs po qd x 2 days then 1 tab po qd x 2 days, Disp: 20 tablet, Rfl: 0  •  aspirin (ECOTRIN LOW STRENGTH) 81 mg EC tablet, Take 2 tablets (162 mg total) by mouth daily, Disp: 60 tablet, Rfl: 5  •  Prenatal Vit-Fe Fumarate-FA (prenatal multivitamin) 28-0.8 MG TABS, Take by mouth every 24 hours, Disp: , Rfl:     Current Allergies     Allergies as of 07/20/2023   • (No Known Allergies)            The following portions of the patient's history were reviewed and updated as appropriate: allergies, current medications, past family history, past medical history, past social history, past surgical history and problem list.     Past Medical History:   Diagnosis Date   • Asthma    • Patient denies medical problems        Past Surgical History:   Procedure Laterality Date   • CYST REMOVAL      neck   • SUPERFICIAL LYMPH NODE BIOPSY / EXCISION     • TONSILLECTOMY     • TONSILLECTOMY         Family History   Problem Relation Age of Onset   • Melanoma Mother    • Cirrhosis Father    • Heart attack Father    • Lymphoma Sister         non hodgkins   • No Known Problems Brother    • No Known Problems Brother          Medications have been verified. Objective   /63   Pulse 86   Temp (!) 97.1 °F (36.2 °C)   Resp 16   Ht 5' 6" (1.676 m)   Wt 76.6 kg (168 lb 12.8 oz)   SpO2 99%   BMI 27.25 kg/m²        Physical Exam     Physical Exam  Vitals and nursing note reviewed. Constitutional:       Appearance: Normal appearance. She is normal weight. HENT:      Head: Normocephalic and atraumatic. Right Ear: Tympanic membrane, ear canal and external ear normal.      Left Ear: Tympanic membrane, ear canal and external ear normal.      Nose: Nose normal.      Mouth/Throat:      Pharynx: Oropharynx is clear. Eyes:      Extraocular Movements: Extraocular movements intact.       Conjunctiva/sclera: Conjunctivae normal.      Pupils: Pupils are equal, round, and reactive to light. Cardiovascular:      Rate and Rhythm: Normal rate and regular rhythm. Pulses: Normal pulses. Heart sounds: Normal heart sounds. Pulmonary:      Effort: Pulmonary effort is normal.      Breath sounds: Normal breath sounds. Abdominal:      General: Abdomen is flat. Bowel sounds are normal.      Palpations: Abdomen is soft. Musculoskeletal:         General: Normal range of motion. Cervical back: Normal range of motion and neck supple. Skin:     General: Skin is warm. Capillary Refill: Capillary refill takes less than 2 seconds. Comments: Left buttocks 2cm erythema and tenderness  Not fluctuant  Right upper arm 4cm erythema and tenderness    Neurological:      General: No focal deficit present. Mental Status: She is alert and oriented to person, place, and time.

## 2024-07-26 ENCOUNTER — OFFICE VISIT (OUTPATIENT)
Dept: GASTROENTEROLOGY | Facility: CLINIC | Age: 39
End: 2024-07-26
Payer: COMMERCIAL

## 2024-07-26 ENCOUNTER — TELEPHONE (OUTPATIENT)
Dept: GASTROENTEROLOGY | Facility: CLINIC | Age: 39
End: 2024-07-26

## 2024-07-26 VITALS
BODY MASS INDEX: 24.85 KG/M2 | DIASTOLIC BLOOD PRESSURE: 62 MMHG | SYSTOLIC BLOOD PRESSURE: 102 MMHG | WEIGHT: 154.6 LBS | HEIGHT: 66 IN

## 2024-07-26 DIAGNOSIS — R10.30 LOWER ABDOMINAL PAIN: ICD-10-CM

## 2024-07-26 DIAGNOSIS — K62.5 RECTAL BLEEDING: Primary | ICD-10-CM

## 2024-07-26 PROCEDURE — 99203 OFFICE O/P NEW LOW 30 MIN: CPT | Performed by: NURSE PRACTITIONER

## 2024-07-26 NOTE — TELEPHONE ENCOUNTER
Scheduled date of colonoscopy (as of today): 8/26/24  Physician performing colonoscopy: RASHID  Location of colonoscopy: [facility]  Bowel prep reviewed with patient: Miralax  Instructions reviewed with patient by: DIAN  Clearances: N

## 2024-07-26 NOTE — PROGRESS NOTES
[facility] - Outpatient Consultation  Antonietta Abbott 39 y.o. female MRN: [id number]  Encounter: [id number]    ASSESSMENT AND PLAN:    1.  Rectal bleeding  2.  Lower abdominal pain  2-week history of bright red blood with wiping after bowel movement, occurs daily with BM  No hematochezia, no melena  Also experiencing crampy lower abdominal pain, usually after bowel movement.  Persist for several hours and resolved  Denies recent acute change in bowel habits, no alarm symptoms  Denies family history of colon cancer or inflammatory bowel disease  No prolapsed hemorrhoids noted on external rectal exam  Bleeding may be hemorrhoidal versus anal fissure  Recommend proceeding with colonoscopy to evaluate for polyp or mass  -Scheduled for colonoscopy at [facility]  -Calmoseptin cream as needed for rectal itching  -Consider proceeding with CT scan abdomen/pelvis if abdominal pain continues and colonoscopy is negative    Followup Appointment: 3 months  ______________________________________________________________________    Chief Complaint   Patient presents with    Rectal Bleeding       HPI:   Antonietta Abbott is a 39 y.o. year old female who presents with 2-week history of rectal bleeding.  Reports of bright red blood with wiping with bowel movement, occurs daily.  Denies hematochezia  No rectal pain  Also experiencing 2-3-week history of lower abdominal pain which usually occurs after bowel movement and persist for several hours.  Describes cramping.  No nausea or vomiting.  Pain gradually resolves without treatment  Denies recent acute change in bowel habits-no constipation or straining to defecate.  No history of hemorrhoids.  Has been experiencing rectal itching for 6 to 8 months     Appetite and weight have been stable.  She does report 60 pound intentional weight loss in the past year    No family history of colon cancer or inflammatory bowel disease.  Sister with irritable  "bowel syndrome        Historical Information   Past Medical History:   Diagnosis Date    Asthma     Patient denies medical problems      Past Surgical History:   Procedure Laterality Date    CYST REMOVAL      neck    SUPERFICIAL LYMPH NODE BIOPSY / EXCISION      TONSILLECTOMY      TONSILLECTOMY       Social History     Substance and Sexual Activity   Alcohol Use Not Currently     Social History     Substance and Sexual Activity   Drug Use Never     Social History     Tobacco Use   Smoking Status Never   Smokeless Tobacco Never     Family History   Problem Relation Age of Onset    Melanoma Mother     Colon polyps Mother     Cirrhosis Father     Heart attack Father     Lymphoma Sister         non hodgkins    No Known Problems Brother     No Known Problems Brother     Colon polyps Maternal Grandmother     Colon cancer Neg Hx        Meds/Allergies     Current Outpatient Medications:     Multiple Vitamins-Minerals (ONE A DAY IMMUNITY DEFENSE PO)    aspirin (ECOTRIN LOW STRENGTH) 81 mg EC tablet    predniSONE 10 mg tablet    Prenatal Vit-Fe Fumarate-FA (prenatal multivitamin) 28-0.8 MG TABS    No Known Allergies    PHYSICAL EXAM:    Blood pressure 102/62, height 5' 6\" (1.676 m), weight 70.1 kg (154 lb 9.6 oz), unknown if currently breastfeeding. Body mass index is 24.95 kg/m².  General Appearance: NAD, cooperative, alert  Eyes: Anicteric  ENT:  Normocephalic, atraumatic, normal mucosa.    Neck:  Supple, symmetrical, trachea midline,   Resp:  Clear to auscultation bilaterally; no rales, rhonchi or wheezing; respirations unlabored   CV:  S1 S2, Regular rate and rhythm; no murmur, rub, or gallop.  GI:  Soft, non-tender, non-distended; normal bowel sounds; no masses, no organomegaly   Rectal: Mild external erythema, no prolapsed tissue or hemorrhoids.  Digital exam not performed  Musculoskeletal: No cyanosis, clubbing or edema. Normal ROM.  Skin:  No jaundice, rashes, or lesions   Psych: Normal affect, good eye " contact  Neuro: No gross deficits, AAOx3          REVIEW OF SYSTEMS:    CONSTITUTIONAL: Denies any fever, chills, rigors, and weight loss.  HEENT: No earache or tinnitus. Denies hearing loss or visual disturbances.  CARDIOVASCULAR: No chest pain or palpitations.   RESPIRATORY: Denies any cough, hemoptysis, shortness of breath or dyspnea on exertion.  GASTROINTESTINAL: As noted in the History of Present Illness.   GENITOURINARY: No problems with urination. Denies any hematuria or dysuria.  NEUROLOGIC: No dizziness or vertigo, denies headaches.   MUSCULOSKELETAL: Denies any muscle or joint pain.   SKIN: Denies skin rashes or itching.   ENDOCRINE: Denies excessive thirst. Denies intolerance to heat or cold.  PSYCHOSOCIAL: Denies depression or anxiety. Denies any recent memory loss.

## 2024-07-31 LAB
BASOPHILS # BLD AUTO: 58 CELLS/UL (ref 0–200)
BASOPHILS NFR BLD AUTO: 1 %
EOSINOPHIL # BLD AUTO: 238 CELLS/UL (ref 15–500)
EOSINOPHIL NFR BLD AUTO: 4.1 %
ERYTHROCYTE [DISTWIDTH] IN BLOOD BY AUTOMATED COUNT: 13.1 % (ref 11–15)
HCT VFR BLD AUTO: 39.1 % (ref 35–45)
HGB BLD-MCNC: 13.3 G/DL (ref 11.7–15.5)
LYMPHOCYTES # BLD AUTO: 2865 CELLS/UL (ref 850–3900)
LYMPHOCYTES NFR BLD AUTO: 49.4 %
MCH RBC QN AUTO: 29.8 PG (ref 27–33)
MCHC RBC AUTO-ENTMCNC: 34 G/DL (ref 32–36)
MCV RBC AUTO: 87.5 FL (ref 80–100)
MONOCYTES # BLD AUTO: 603 CELLS/UL (ref 200–950)
MONOCYTES NFR BLD AUTO: 10.4 %
NEUTROPHILS # BLD AUTO: 2036 CELLS/UL (ref 1500–7800)
NEUTROPHILS NFR BLD AUTO: 35.1 %
PLATELET # BLD AUTO: 238 THOUSAND/UL (ref 140–400)
PMV BLD REES-ECKER: 11.1 FL (ref 7.5–12.5)
RBC # BLD AUTO: 4.47 MILLION/UL (ref 3.8–5.1)
WBC # BLD AUTO: 5.8 THOUSAND/UL (ref 3.8–10.8)

## 2024-08-12 ENCOUNTER — ANESTHESIA EVENT (OUTPATIENT)
Dept: ANESTHESIOLOGY | Facility: AMBULATORY SURGERY CENTER | Age: 39
End: 2024-08-12

## 2024-08-12 ENCOUNTER — ANESTHESIA (OUTPATIENT)
Dept: ANESTHESIOLOGY | Facility: AMBULATORY SURGERY CENTER | Age: 39
End: 2024-08-12

## 2024-08-26 ENCOUNTER — HOSPITAL ENCOUNTER (OUTPATIENT)
Dept: GASTROENTEROLOGY | Facility: AMBULATORY SURGERY CENTER | Age: 39
Discharge: HOME | End: 2024-08-26
Payer: COMMERCIAL

## 2024-08-26 ENCOUNTER — ANESTHESIA EVENT (OUTPATIENT)
Dept: GASTROENTEROLOGY | Facility: AMBULATORY SURGERY CENTER | Age: 39
End: 2024-08-26

## 2024-08-26 ENCOUNTER — ANESTHESIA (OUTPATIENT)
Dept: GASTROENTEROLOGY | Facility: AMBULATORY SURGERY CENTER | Age: 39
End: 2024-08-26

## 2024-08-26 VITALS
OXYGEN SATURATION: 100 % | TEMPERATURE: 98 F | DIASTOLIC BLOOD PRESSURE: 55 MMHG | BODY MASS INDEX: 24.75 KG/M2 | HEART RATE: 79 BPM | HEIGHT: 66 IN | RESPIRATION RATE: 16 BRPM | WEIGHT: 154 LBS | SYSTOLIC BLOOD PRESSURE: 99 MMHG

## 2024-08-26 DIAGNOSIS — K62.5 RECTAL BLEEDING: ICD-10-CM

## 2024-08-26 DIAGNOSIS — R10.30 LOWER ABDOMINAL PAIN: ICD-10-CM

## 2024-08-26 PROCEDURE — 45380 COLONOSCOPY AND BIOPSY: CPT | Performed by: INTERNAL MEDICINE

## 2024-08-26 PROCEDURE — 88305 TISSUE EXAM BY PATHOLOGIST: CPT | Performed by: STUDENT IN AN ORGANIZED HEALTH CARE EDUCATION/TRAINING PROGRAM

## 2024-08-26 RX ORDER — PROPOFOL 10 MG/ML
INJECTION, EMULSION INTRAVENOUS CONTINUOUS PRN
Status: DISCONTINUED | OUTPATIENT
Start: 2024-08-26 | End: 2024-08-26

## 2024-08-26 RX ORDER — PROPOFOL 10 MG/ML
INJECTION, EMULSION INTRAVENOUS AS NEEDED
Status: DISCONTINUED | OUTPATIENT
Start: 2024-08-26 | End: 2024-08-26

## 2024-08-26 RX ORDER — SODIUM CHLORIDE, SODIUM LACTATE, POTASSIUM CHLORIDE, CALCIUM CHLORIDE 600; 310; 30; 20 MG/100ML; MG/100ML; MG/100ML; MG/100ML
50 INJECTION, SOLUTION INTRAVENOUS CONTINUOUS
Status: DISCONTINUED | OUTPATIENT
Start: 2024-08-26 | End: 2024-08-30 | Stop reason: HOSPADM

## 2024-08-26 RX ADMIN — SODIUM CHLORIDE, SODIUM LACTATE, POTASSIUM CHLORIDE, CALCIUM CHLORIDE: 600; 310; 30; 20 INJECTION, SOLUTION INTRAVENOUS at 09:55

## 2024-08-26 RX ADMIN — PROPOFOL 100 MCG/KG/MIN: 10 INJECTION, EMULSION INTRAVENOUS at 09:57

## 2024-08-26 RX ADMIN — PROPOFOL 150 MG: 10 INJECTION, EMULSION INTRAVENOUS at 09:57

## 2024-08-26 NOTE — ANESTHESIA PREPROCEDURE EVALUATION
Procedure:  COLONOSCOPY    Relevant Problems   CARDIO   (+) Migraine with aura and without status migrainosus, not intractable      GI/HEPATIC   (+) Rectal bleeding      NEURO/PSYCH   (+) Migraine with aura and without status migrainosus, not intractable      Asthma - 5 years since last inhaler use. Exercise-induced  Physical Exam    Airway    Mallampati score: I  TM Distance: >3 FB  Neck ROM: full     Dental   Comment: None loose, No notable dental hx     Cardiovascular      Pulmonary      Other Findings  post-pubertal.      Anesthesia Plan  ASA Score- 2     Anesthesia Type- IV sedation with anesthesia with ASA Monitors.         Additional Monitors:     Airway Plan:     Comment: Last of PO bowel prep: 05:30    Patient is s/p tubal ligation    Patient educated on the possibility for awareness under sedation and of the possibility of airway intervention in the event of an airway or procedural emergency  .       Plan Factors-Exercise tolerance (METS): >4 METS.    Chart reviewed.    Patient summary reviewed.    Patient is not a current smoker.              Induction- intravenous.    Postoperative Plan-         Informed Consent- Anesthetic plan and risks discussed with patient.  I personally reviewed this patient with the CRNA. Discussed and agreed on the Anesthesia Plan with the CRNA..

## 2024-08-26 NOTE — H&P
"History and Physical -  [facility]  Antonietta Abbott 39 y.o. female MRN: [id number]    HPI: Antonietta Abbott is a 39 y.o. year old female who presents for rectal bleeding    REVIEW OF SYSTEMS: Per the HPI, and otherwise unremarkable.    Historical Information   Past Medical History:   Diagnosis Date    Asthma     Patient denies medical problems      Past Surgical History:   Procedure Laterality Date    CYST REMOVAL      neck    SUPERFICIAL LYMPH NODE BIOPSY / EXCISION      neck and left breast    TONSILLECTOMY      TONSILLECTOMY       Social History   Social History     Substance and Sexual Activity   Alcohol Use Not Currently    Comment: rare     Social History     Substance and Sexual Activity   Drug Use Never     Social History     Tobacco Use   Smoking Status Never   Smokeless Tobacco Never     Family History   Problem Relation Age of Onset    Melanoma Mother     Colon polyps Mother     Cirrhosis Father     Heart attack Father     Lymphoma Sister         non hodgkins    No Known Problems Brother     No Known Problems Brother     Colon polyps Maternal Grandmother     Colon cancer Neg Hx        Meds/Allergies       Current Outpatient Medications:     Multiple Vitamins-Minerals (ONE A DAY IMMUNITY DEFENSE PO)    aspirin (ECOTRIN LOW STRENGTH) 81 mg EC tablet    predniSONE 10 mg tablet    Prenatal Vit-Fe Fumarate-FA (prenatal multivitamin) 28-0.8 MG TABS    Current Facility-Administered Medications:     lactated ringers infusion, 50 mL/hr, Intravenous, Continuous    No Known Allergies    Objective     BP 98/55   Pulse 80   Temp 98 °F (36.7 °C) (Temporal)   Resp 19   Ht 5' 6\" (1.676 m)   Wt 69.9 kg (154 lb)   LMP 08/08/2024 (Exact Date) Comment: Pt had tubal ligation  SpO2 97%   BMI 24.86 kg/m²     PHYSICAL EXAM    Gen: NAD AAOx3  Head: Normocephalic, Atraumatic  CV: S1S2 RRR no m/r/g  CHEST: Clear b/l no c/r/w  ABD: soft, +BS NT/ND  EXT: no edema    ASSESSMENT/PLAN:  This is a 39 y.o. " year old female here for colonoscopy, and she is stable and optimized for her procedure.

## 2024-08-27 ENCOUNTER — TELEPHONE (OUTPATIENT)
Dept: GASTROENTEROLOGY | Facility: CLINIC | Age: 39
End: 2024-08-27

## 2024-08-29 PROCEDURE — 88305 TISSUE EXAM BY PATHOLOGIST: CPT | Performed by: STUDENT IN AN ORGANIZED HEALTH CARE EDUCATION/TRAINING PROGRAM

## 2024-09-09 ENCOUNTER — HOSPITAL ENCOUNTER (EMERGENCY)
Facility: HOSPITAL | Age: 39
Discharge: HOME | End: 2024-09-09
Attending: EMERGENCY MEDICINE
Payer: COMMERCIAL

## 2024-09-09 ENCOUNTER — APPOINTMENT (EMERGENCY)
Dept: CT IMAGING | Facility: HOSPITAL | Age: 39
End: 2024-09-09
Payer: COMMERCIAL

## 2024-09-09 ENCOUNTER — APPOINTMENT (EMERGENCY)
Dept: RADIOLOGY | Facility: HOSPITAL | Age: 39
End: 2024-09-09
Payer: COMMERCIAL

## 2024-09-09 ENCOUNTER — OFFICE VISIT (OUTPATIENT)
Dept: URGENT CARE | Facility: CLINIC | Age: 39
End: 2024-09-09
Payer: COMMERCIAL

## 2024-09-09 VITALS
TEMPERATURE: 98.7 F | SYSTOLIC BLOOD PRESSURE: 110 MMHG | HEART RATE: 86 BPM | DIASTOLIC BLOOD PRESSURE: 57 MMHG | RESPIRATION RATE: 16 BRPM | OXYGEN SATURATION: 97 %

## 2024-09-09 VITALS
OXYGEN SATURATION: 97 % | HEART RATE: 102 BPM | RESPIRATION RATE: 16 BRPM | SYSTOLIC BLOOD PRESSURE: 118 MMHG | DIASTOLIC BLOOD PRESSURE: 60 MMHG | BODY MASS INDEX: 23.63 KG/M2 | TEMPERATURE: 98.5 F | HEIGHT: 66 IN | WEIGHT: 147 LBS

## 2024-09-09 DIAGNOSIS — R11.2 NAUSEA AND VOMITING, UNSPECIFIED VOMITING TYPE: ICD-10-CM

## 2024-09-09 DIAGNOSIS — R10.13 EPIGASTRIC PAIN: Primary | ICD-10-CM

## 2024-09-09 DIAGNOSIS — D72.829 LEUKOCYTOSIS: ICD-10-CM

## 2024-09-09 DIAGNOSIS — R07.9 CHEST PAIN, UNSPECIFIED TYPE: ICD-10-CM

## 2024-09-09 DIAGNOSIS — J18.9 PNEUMONIA: Primary | ICD-10-CM

## 2024-09-09 DIAGNOSIS — R93.5 ABNORMAL CT OF THE ABDOMEN: ICD-10-CM

## 2024-09-09 LAB
ALBUMIN SERPL BCG-MCNC: 4.2 G/DL (ref 3.5–5)
ALP SERPL-CCNC: 60 U/L (ref 34–104)
ALT SERPL W P-5'-P-CCNC: 12 U/L (ref 7–52)
ANION GAP SERPL CALCULATED.3IONS-SCNC: 7 MMOL/L (ref 4–13)
AST SERPL W P-5'-P-CCNC: 15 U/L (ref 13–39)
ATRIAL RATE: 89 BPM
BACTERIA UR QL AUTO: ABNORMAL /HPF
BASOPHILS # BLD AUTO: 0.06 THOUSANDS/ÂΜL (ref 0–0.1)
BASOPHILS NFR BLD AUTO: 0 % (ref 0–1)
BILIRUB SERPL-MCNC: 1.12 MG/DL (ref 0.2–1)
BILIRUB UR QL STRIP: NEGATIVE
BUN SERPL-MCNC: 12 MG/DL (ref 5–25)
CALCIUM SERPL-MCNC: 9.2 MG/DL (ref 8.4–10.2)
CHLORIDE SERPL-SCNC: 101 MMOL/L (ref 96–108)
CLARITY UR: ABNORMAL
CO2 SERPL-SCNC: 28 MMOL/L (ref 21–32)
COLOR UR: YELLOW
CREAT SERPL-MCNC: 0.74 MG/DL (ref 0.6–1.3)
EOSINOPHIL # BLD AUTO: 0.01 THOUSAND/ÂΜL (ref 0–0.61)
EOSINOPHIL NFR BLD AUTO: 0 % (ref 0–6)
ERYTHROCYTE [DISTWIDTH] IN BLOOD BY AUTOMATED COUNT: 13.2 % (ref 11.6–15.1)
EXT PREGNANCY TEST URINE: NEGATIVE
EXT. CONTROL: NORMAL
FLUAV RNA RESP QL NAA+PROBE: NEGATIVE
FLUBV RNA RESP QL NAA+PROBE: NEGATIVE
GFR SERPL CREATININE-BSD FRML MDRD: 103 ML/MIN/1.73SQ M
GLUCOSE SERPL-MCNC: 99 MG/DL (ref 65–140)
GLUCOSE UR STRIP-MCNC: NEGATIVE MG/DL
HCT VFR BLD AUTO: 39.7 % (ref 34.8–46.1)
HGB BLD-MCNC: 13.5 G/DL (ref 11.5–15.4)
HGB UR QL STRIP.AUTO: ABNORMAL
IMM GRANULOCYTES # BLD AUTO: 0.14 THOUSAND/UL (ref 0–0.2)
IMM GRANULOCYTES NFR BLD AUTO: 1 % (ref 0–2)
KETONES UR STRIP-MCNC: ABNORMAL MG/DL
LACTATE SERPL-SCNC: 0.8 MMOL/L (ref 0.5–2)
LEUKOCYTE ESTERASE UR QL STRIP: NEGATIVE
LIPASE SERPL-CCNC: 6 U/L (ref 11–82)
LYMPHOCYTES # BLD AUTO: 3.34 THOUSANDS/ÂΜL (ref 0.6–4.47)
LYMPHOCYTES NFR BLD AUTO: 15 % (ref 14–44)
MAGNESIUM SERPL-MCNC: 1.9 MG/DL (ref 1.9–2.7)
MCH RBC QN AUTO: 30.1 PG (ref 26.8–34.3)
MCHC RBC AUTO-ENTMCNC: 34 G/DL (ref 31.4–37.4)
MCV RBC AUTO: 89 FL (ref 82–98)
MONOCYTES # BLD AUTO: 1.28 THOUSAND/ÂΜL (ref 0.17–1.22)
MONOCYTES NFR BLD AUTO: 6 % (ref 4–12)
MUCOUS THREADS UR QL AUTO: ABNORMAL
NEUTROPHILS # BLD AUTO: 17.45 THOUSANDS/ÂΜL (ref 1.85–7.62)
NEUTS SEG NFR BLD AUTO: 78 % (ref 43–75)
NITRITE UR QL STRIP: NEGATIVE
NON-SQ EPI CELLS URNS QL MICRO: ABNORMAL /HPF
NRBC BLD AUTO-RTO: 0 /100 WBCS
P AXIS: 76 DEGREES
PH UR STRIP.AUTO: 6 [PH]
PLATELET # BLD AUTO: 258 THOUSANDS/UL (ref 149–390)
PMV BLD AUTO: 10 FL (ref 8.9–12.7)
POTASSIUM SERPL-SCNC: 3.4 MMOL/L (ref 3.5–5.3)
PR INTERVAL: 146 MS
PROT SERPL-MCNC: 7.4 G/DL (ref 6.4–8.4)
PROT UR STRIP-MCNC: ABNORMAL MG/DL
QRS AXIS: 74 DEGREES
QRSD INTERVAL: 92 MS
QT INTERVAL: 352 MS
QTC INTERVAL: 428 MS
RBC # BLD AUTO: 4.48 MILLION/UL (ref 3.81–5.12)
RBC #/AREA URNS AUTO: ABNORMAL /HPF
RSV RNA RESP QL NAA+PROBE: NEGATIVE
SARS-COV-2 RNA RESP QL NAA+PROBE: NEGATIVE
SODIUM SERPL-SCNC: 136 MMOL/L (ref 135–147)
SP GR UR STRIP.AUTO: >=1.03 (ref 1–1.03)
T WAVE AXIS: 23 DEGREES
UROBILINOGEN UR STRIP-ACNC: <2 MG/DL
VENTRICULAR RATE: 89 BPM
WBC # BLD AUTO: 22.28 THOUSAND/UL (ref 4.31–10.16)
WBC #/AREA URNS AUTO: ABNORMAL /HPF

## 2024-09-09 PROCEDURE — 0241U HB NFCT DS VIR RESP RNA 4 TRGT: CPT | Performed by: EMERGENCY MEDICINE

## 2024-09-09 PROCEDURE — 81025 URINE PREGNANCY TEST: CPT

## 2024-09-09 PROCEDURE — 99284 EMERGENCY DEPT VISIT MOD MDM: CPT

## 2024-09-09 PROCEDURE — 80053 COMPREHEN METABOLIC PANEL: CPT | Performed by: EMERGENCY MEDICINE

## 2024-09-09 PROCEDURE — 85025 COMPLETE CBC W/AUTO DIFF WBC: CPT | Performed by: EMERGENCY MEDICINE

## 2024-09-09 PROCEDURE — 93005 ELECTROCARDIOGRAM TRACING: CPT

## 2024-09-09 PROCEDURE — 83605 ASSAY OF LACTIC ACID: CPT

## 2024-09-09 PROCEDURE — 83735 ASSAY OF MAGNESIUM: CPT

## 2024-09-09 PROCEDURE — 99285 EMERGENCY DEPT VISIT HI MDM: CPT

## 2024-09-09 PROCEDURE — 96361 HYDRATE IV INFUSION ADD-ON: CPT

## 2024-09-09 PROCEDURE — 71046 X-RAY EXAM CHEST 2 VIEWS: CPT

## 2024-09-09 PROCEDURE — 74177 CT ABD & PELVIS W/CONTRAST: CPT

## 2024-09-09 PROCEDURE — 81001 URINALYSIS AUTO W/SCOPE: CPT

## 2024-09-09 PROCEDURE — 83690 ASSAY OF LIPASE: CPT | Performed by: EMERGENCY MEDICINE

## 2024-09-09 PROCEDURE — 99204 OFFICE O/P NEW MOD 45 MIN: CPT | Performed by: PHYSICIAN ASSISTANT

## 2024-09-09 PROCEDURE — 96374 THER/PROPH/DIAG INJ IV PUSH: CPT

## 2024-09-09 PROCEDURE — 36415 COLL VENOUS BLD VENIPUNCTURE: CPT

## 2024-09-09 PROCEDURE — 93010 ELECTROCARDIOGRAM REPORT: CPT | Performed by: INTERNAL MEDICINE

## 2024-09-09 PROCEDURE — 99213 OFFICE O/P EST LOW 20 MIN: CPT | Performed by: PHYSICIAN ASSISTANT

## 2024-09-09 RX ORDER — AMOXICILLIN 250 MG/1
1000 CAPSULE ORAL ONCE
Status: COMPLETED | OUTPATIENT
Start: 2024-09-09 | End: 2024-09-09

## 2024-09-09 RX ORDER — AMOXICILLIN 500 MG/1
1000 CAPSULE ORAL 3 TIMES DAILY
Qty: 30 CAPSULE | Refills: 0 | Status: SHIPPED | OUTPATIENT
Start: 2024-09-09 | End: 2024-09-14

## 2024-09-09 RX ORDER — ACETAMINOPHEN 10 MG/ML
1000 INJECTION, SOLUTION INTRAVENOUS ONCE
Status: COMPLETED | OUTPATIENT
Start: 2024-09-09 | End: 2024-09-09

## 2024-09-09 RX ADMIN — AMOXICILLIN 1000 MG: 250 CAPSULE ORAL at 15:00

## 2024-09-09 RX ADMIN — IOHEXOL 100 ML: 350 INJECTION, SOLUTION INTRAVENOUS at 11:22

## 2024-09-09 RX ADMIN — ACETAMINOPHEN 1000 MG: 10 INJECTION INTRAVENOUS at 11:10

## 2024-09-09 RX ADMIN — SODIUM CHLORIDE 1000 ML: 0.9 INJECTION, SOLUTION INTRAVENOUS at 11:09

## 2024-09-09 NOTE — CONSULTS
Consultation - General Surgery   Antonietta Abbott 39 y.o. female MRN: [id number]  Unit/Bed#: ED 11 Encounter: [id number]    Assessment & Plan     Bri is a 39-year-old female who presented to the ED with c/o a cold-like symptoms with productive cough with abdominal discomfort and abnormal CT scan. We were consulted for abdominal pain.    CT scan A/P:  Consolidation right lower lobe with associated minimal branching patchy densities left lower lobe, compatible with pneumonia  Appendix is borderline enlarged however there are no other secondary signs of acute appendicitis. Correlate clinically if concern for acute appendicitis   No fluid collection seen  No bowel obstruction    WBC 22.28    COVID Neg    Assessment:  Pneumonia  -no associated SOB    2. Abdominal pain with borderline enlarged appendix  -CT reviewed.  Normal appearing appendix without inflammatory changes to suggest appendicitis    Plan:  Leukocytosis likely related to PNA  Right abdominal discomfort likely related to significant PNA and irritation of diaphragm  Would recommend treating PNA and monitor for worsening abdominal pain or transition to RLQ  Abx for PNA would likely cover early appendicitis  No indication for surgical intervention at this time  Patient should return if symptoms worsen, develops fever    History of Present Illness     HPI:  Antonietta Abbott is a 39 y.o. female with no significant PMHx who presents to the ED with cough with productive sputum, congestion and HA and  right sided abdominal pain. The pain was reported to be excessively severe after vomiting on 09/08/24 but is now a dull, constant pain. The pain is worsened by coughing. She has not experienced pain like this in the past. No SOB.     Inpatient consult to Acute Care Surgery  Consult performed by: Preethi Duval PA-C  Consult ordered by: Felicia Paul PA-C          Review of Systems   Constitutional:  Negative for chills and fever.   HENT:  Negative for  ear pain and sore throat.    Eyes:  Negative for pain and visual disturbance.   Respiratory:  Positive for cough. Negative for shortness of breath.    Cardiovascular:  Negative for chest pain and palpitations.   Gastrointestinal:  Positive for abdominal pain and vomiting.   Genitourinary:  Negative for dysuria and hematuria.   Musculoskeletal:  Negative for arthralgias and back pain.   Skin:  Negative for color change and rash.   Neurological:  Negative for seizures and syncope.   Psychiatric/Behavioral: Negative.     All other systems reviewed and are negative.      Historical Information   Past Medical History:   Diagnosis Date    Asthma     Patient denies medical problems      Past Surgical History:   Procedure Laterality Date    CYST REMOVAL      neck    SUPERFICIAL LYMPH NODE BIOPSY / EXCISION      neck and left breast    TONSILLECTOMY      TONSILLECTOMY       Social History   Social History     Substance and Sexual Activity   Alcohol Use Not Currently    Comment: rare     Social History     Substance and Sexual Activity   Drug Use Never     E-Cigarette/Vaping    E-Cigarette Use Never User      E-Cigarette/Vaping Substances     Social History     Tobacco Use   Smoking Status Never   Smokeless Tobacco Never     Family History: non-contributory    Meds/Allergies   all current active meds have been reviewed  No Known Allergies    Objective   First Vitals:   Blood Pressure: 116/75 (09/09/24 1014)  Pulse: 95 (09/09/24 1014)  Temperature: 98.7 °F (37.1 °C) (09/09/24 1014)  Temp Source: Oral (09/09/24 1014)  Respirations: 18 (09/09/24 1014)  SpO2: 99 % (09/09/24 1014)    Current Vitals:   Blood Pressure: 110/57 (09/09/24 1430)  Pulse: 86 (09/09/24 1445)  Temperature: 98.7 °F (37.1 °C) (09/09/24 1014)  Temp Source: Oral (09/09/24 1014)  Respirations: 16 (09/09/24 1429)  SpO2: 97 % (09/09/24 1445)      Intake/Output Summary (Last 24 hours) at 9/9/2024 1505  Last data filed at 9/9/2024 1209  Gross per 24 hour   Intake  1100 ml   Output --   Net 1100 ml       Invasive Devices       None                   Physical Exam  Vitals and nursing note reviewed.   Constitutional:       General: She is not in acute distress.     Appearance: Normal appearance. She is well-developed and normal weight. She is ill-appearing.   HENT:      Head: Normocephalic and atraumatic.   Eyes:      Conjunctiva/sclera: Conjunctivae normal.   Cardiovascular:      Rate and Rhythm: Normal rate and regular rhythm.      Heart sounds: No murmur heard.  Pulmonary:      Effort: Pulmonary effort is normal. No respiratory distress.      Breath sounds: No wheezing.      Comments: Coarse BS with decreased BS right base    Abdominal:      General: Abdomen is flat. Bowel sounds are normal.      Palpations: Abdomen is soft. There is no mass.      Tenderness: There is abdominal tenderness. There is no guarding or rebound.      Comments: Tender right upper abdomen  Negative McBurney's sign  Negative Rovsing's sign   Musculoskeletal:         General: No swelling or deformity.      Cervical back: Neck supple.   Skin:     General: Skin is warm and dry.   Neurological:      General: No focal deficit present.      Mental Status: She is alert.   Psychiatric:         Mood and Affect: Mood normal.         Lab Results: I have personally reviewed pertinent lab results.  , CBC:   Lab Results   Component Value Date    WBC 22.28 (H) 09/09/2024    HGB 13.5 09/09/2024    HCT 39.7 09/09/2024    MCV 89 09/09/2024     09/09/2024    RBC 4.48 09/09/2024    MCH 30.1 09/09/2024    MCHC 34.0 09/09/2024    RDW 13.2 09/09/2024    MPV 10.0 09/09/2024    NRBC 0 09/09/2024   , CMP:   Lab Results   Component Value Date    SODIUM 136 09/09/2024    K 3.4 (L) 09/09/2024     09/09/2024    CO2 28 09/09/2024    BUN 12 09/09/2024    CREATININE 0.74 09/09/2024    CALCIUM 9.2 09/09/2024    AST 15 09/09/2024    ALT 12 09/09/2024    ALKPHOS 60 09/09/2024    EGFR 103 09/09/2024   , Coagulation: No  "results found for: \"PT\", \"INR\", \"APTT\", Urinalysis:   Lab Results   Component Value Date    COLORU Yellow 09/09/2024    CLARITYU Slightly Cloudy 09/09/2024    SPECGRAV >=1.030 09/09/2024    PHUR 6.0 09/09/2024    LEUKOCYTESUR Negative 09/09/2024    NITRITE Negative 09/09/2024    GLUCOSEU Negative 09/09/2024    KETONESU Trace (A) 09/09/2024    BILIRUBINUR Negative 09/09/2024    BLOODU Moderate (A) 09/09/2024   , Amylase: No results found for: \"AMYLASE\", Lipase:   Lab Results   Component Value Date    LIPASE 6 (L) 09/09/2024     Imaging: I have personally reviewed pertinent reports.    EKG, Pathology, and Other Studies: I have personally reviewed pertinent reports.      Counseling / Coordination of Care  Total floor / unit time spent today 30 minutes.  Greater than 50% of total time was spent with the patient and / or family counseling and / or coordination of care.      Preethi Duval    "

## 2024-09-09 NOTE — QUICK NOTE
Pt was seen and examined. She was updated on plan as advised by the general surgery team.   Pt with tenderness on palpation in the right mid quadrants, no guarding, no rebound, abdomen soft non distended  CT was reviewed by Dr Cunningham and Merlyn Magana--> CT not consistent with acute appendicitis findings. CT also shows RLQ pneumonia. Recommendations at this time are antibiotics and close monitoring of symptoms. If patient gets worsening symptoms or RLQ abdominal pain, nausea, fevers, vomiting, anorexia etc, she was advised to get re-evaluated.    Claudia Mahajan

## 2024-09-09 NOTE — PROGRESS NOTES
"  [facility]        NAME: Antonietta Abbott is a 39 y.o. female  : 1985    MRN: [id number]  DATE: 2024  TIME: 9:36 AM    Assessment and Plan   Epigastric pain [R10.13]  1. Epigastric pain  Transfer to other facility      2. Chest pain, unspecified type  Transfer to other facility      3. Nausea and vomiting, unspecified vomiting type  Transfer to other facility            Patient Instructions       Follow up with PCP in 3-5 days.  Proceed to  ER if symptoms worsen.    If tests have been performed at [facility], our office will contact you with results if changes need to be made to the care plan discussed with you at the visit.  [facility]    Chief Complaint     Chief Complaint   Patient presents with    Cold Like Symptoms     Pt reports cold like symptoms with productive cough. States green sputum. C/o nasal congestion and h/a with onset one month ago. C/o fever of 102.0 F yesterday with nausea and vomiting. C/o abdominal pain. Managing symptoms with otc decongestant medication and Pepto. Denies any nausea and vomiting today.          History of Present Illness       Patient presents with 1 month of a head cold.  Has a productive cough, runny nose, ear pressure,   Has chest pains that feels like someone sitting on chest.     Yesterday started with vomiting from 2 to 9 pm and a fever. Had multiple episodes of vomiting yesterday but none today. Fever resolved as well.   Also with abdominal pain described as a shooting pain behind belly button going right and up. Rated 8/10 nothing makes it better or worse.   Shoulder blades also \"hurt so bad it hurts to breath\".  Rated 7/10.   Denies any diarrhea, blood in vomiting, blood in stool, melena, changes to diet/medications, did not eat anything abnormal.     also with a cold.         Review of Systems   Review of Systems   Constitutional:  Positive for fever. Negative for chills and fatigue. "   HENT:  Positive for congestion. Negative for ear discharge, ear pain, postnasal drip, rhinorrhea, sinus pressure, sinus pain and sore throat.    Respiratory:  Positive for cough. Negative for chest tightness, shortness of breath and wheezing.    Cardiovascular:  Negative for chest pain and palpitations.   Gastrointestinal:  Positive for abdominal pain, nausea and vomiting. Negative for constipation and diarrhea.   Musculoskeletal:  Negative for arthralgias and myalgias.   Neurological:  Positive for headaches. Negative for weakness.   Psychiatric/Behavioral:  Negative for confusion.          Current Medications       Current Outpatient Medications:     aspirin (ECOTRIN LOW STRENGTH) 81 mg EC tablet, Take 2 tablets (162 mg total) by mouth daily, Disp: 60 tablet, Rfl: 5    Multiple Vitamins-Minerals (ONE A DAY IMMUNITY DEFENSE PO), as directed Orally (Patient not taking: Reported on 9/9/2024), Disp: , Rfl:     predniSONE 10 mg tablet, 4 tabs po qd x 2 days then 3 tabs po qd x 2 days then 2 tabs po qd x 2 days then 1 tab po qd x 2 days, Disp: 20 tablet, Rfl: 0    Prenatal Vit-Fe Fumarate-FA (prenatal multivitamin) 28-0.8 MG TABS, Take by mouth every 24 hours, Disp: , Rfl:     Current Allergies     Allergies as of 09/09/2024    (No Known Allergies)            The following portions of the patient's history were reviewed and updated as appropriate: allergies, current medications, past family history, past medical history, past social history, past surgical history and problem list.     Past Medical History:   Diagnosis Date    Asthma     Patient denies medical problems        Past Surgical History:   Procedure Laterality Date    CYST REMOVAL      neck    SUPERFICIAL LYMPH NODE BIOPSY / EXCISION      neck and left breast    TONSILLECTOMY      TONSILLECTOMY         Family History   Problem Relation Age of Onset    Melanoma Mother     Colon polyps Mother     Cirrhosis Father     Heart attack Father     Lymphoma Sister      "    non hodgkins    No Known Problems Brother     No Known Problems Brother     Colon polyps Maternal Grandmother     Colon cancer Neg Hx          Medications have been verified.        Objective   /60 (BP Location: Right arm, Patient Position: Sitting)   Pulse 102   Temp 98.5 °F (36.9 °C)   Resp 16   Ht 5' 6\" (1.676 m)   Wt 66.7 kg (147 lb)   LMP 08/08/2024 (Exact Date) Comment: Pt had tubal ligation  SpO2 97%   BMI 23.73 kg/m²   Patient's last menstrual period was 08/08/2024 (exact date).       Physical Exam     Physical Exam  Constitutional:       General: She is not in acute distress.     Appearance: Normal appearance. She is not ill-appearing or diaphoretic.   HENT:      Right Ear: Tympanic membrane, ear canal and external ear normal.      Left Ear: Tympanic membrane, ear canal and external ear normal.      Nose: Nose normal.      Mouth/Throat:      Mouth: Mucous membranes are moist.      Pharynx: Oropharynx is clear.   Eyes:      Conjunctiva/sclera: Conjunctivae normal.   Cardiovascular:      Rate and Rhythm: Normal rate and regular rhythm.      Heart sounds: Normal heart sounds.   Pulmonary:      Effort: Pulmonary effort is normal.      Breath sounds: Normal breath sounds.   Abdominal:      General: Abdomen is flat. Bowel sounds are normal.      Palpations: Abdomen is soft.      Tenderness: There is abdominal tenderness in the right upper quadrant, epigastric area and periumbilical area. There is guarding (epigastric area). There is no rebound. Negative signs include Blanco's sign, Rovsing's sign and McBurney's sign.      Hernia: No hernia is present.   Skin:     General: Skin is warm and dry.   Neurological:      Mental Status: She is alert.   Psychiatric:         Mood and Affect: Mood normal.         Behavior: Behavior normal.                   "

## 2024-09-09 NOTE — ED PROVIDER NOTES
"History  Chief Complaint   Patient presents with    Abdominal Pain     C/o R sided abd pain and vomiting since yesterday. States she has had same issue every month x6 months. Also c/o sinus cold.      Patient is a 39-year-old female presenting to the emergency department from urgent care for evaluation of right sided abdominal pain that initially started yesterday with associated nausea and vomiting that occurred from 2 PM to 9 PM. Denies any episodes of emesis today. Patient also reports a \"head cold\" for the past month and a productive cough that she has been treating with over-the-counter medications, however notes no improvement. Denies any recent travel, illness, chest pain, shortness of breath, changes in bowel movements or urinary habits.      Abdominal Pain  Associated symptoms: cough, fever, nausea and vomiting    Associated symptoms: no chest pain, no chills, no constipation, no diarrhea, no dysuria, no hematuria, no shortness of breath and no sore throat        Prior to Admission Medications   Prescriptions Last Dose Informant Patient Reported? Taking?   Multiple Vitamins-Minerals (ONE A DAY IMMUNITY DEFENSE PO)   Yes No   Sig: as directed Orally   Patient not taking: Reported on 2024   Prenatal Vit-Fe Fumarate-FA (prenatal multivitamin) 28-0.8 MG TABS  Self Yes No   Sig: Take by mouth every 24 hours   aspirin (ECOTRIN LOW STRENGTH) 81 mg EC tablet  Self No No   Sig: Take 2 tablets (162 mg total) by mouth daily   predniSONE 10 mg tablet   No No   Si tabs po qd x 2 days then 3 tabs po qd x 2 days then 2 tabs po qd x 2 days then 1 tab po qd x 2 days      Facility-Administered Medications: None       Past Medical History:   Diagnosis Date    Asthma     Patient denies medical problems        Past Surgical History:   Procedure Laterality Date    CYST REMOVAL      neck    SUPERFICIAL LYMPH NODE BIOPSY / EXCISION      neck and left breast    TONSILLECTOMY      TONSILLECTOMY         Family History "   Problem Relation Age of Onset    Melanoma Mother     Colon polyps Mother     Cirrhosis Father     Heart attack Father     Lymphoma Sister         non hodgkins    No Known Problems Brother     No Known Problems Brother     Colon polyps Maternal Grandmother     Colon cancer Neg Hx      I have reviewed and agree with the history as documented.    E-Cigarette/Vaping    E-Cigarette Use Never User      E-Cigarette/Vaping Substances     Social History     Tobacco Use    Smoking status: Never    Smokeless tobacco: Never   Vaping Use    Vaping status: Never Used   Substance Use Topics    Alcohol use: Not Currently     Comment: rare    Drug use: Never       Review of Systems   Constitutional:  Positive for fever. Negative for chills.   HENT:  Positive for congestion and rhinorrhea. Negative for ear pain and sore throat.    Eyes:  Negative for visual disturbance.   Respiratory:  Positive for cough. Negative for chest tightness and shortness of breath.    Cardiovascular:  Negative for chest pain and palpitations.   Gastrointestinal:  Positive for abdominal pain, nausea and vomiting. Negative for constipation and diarrhea.   Genitourinary:  Negative for difficulty urinating, dysuria and hematuria.   Musculoskeletal:  Negative for arthralgias and back pain.   Skin:  Negative for color change and rash.   Neurological:  Negative for dizziness, seizures, syncope, light-headedness and headaches.   All other systems reviewed and are negative.      Physical Exam  Physical Exam  Vitals and nursing note reviewed.   Constitutional:       General: She is not in acute distress.     Appearance: She is well-developed. She is not ill-appearing.   HENT:      Head: Normocephalic and atraumatic.   Eyes:      Conjunctiva/sclera: Conjunctivae normal.   Cardiovascular:      Rate and Rhythm: Normal rate and regular rhythm.   Pulmonary:      Effort: Pulmonary effort is normal. No respiratory distress.      Breath sounds: Normal breath sounds.    Abdominal:      General: Abdomen is flat. Bowel sounds are normal. There is no distension.      Palpations: Abdomen is soft.      Tenderness: There is abdominal tenderness (Very mild) in the right lower quadrant. There is no right CVA tenderness, left CVA tenderness, guarding or rebound. Negative signs include McBurney's sign.   Musculoskeletal:         General: No swelling.      Cervical back: Neck supple.   Skin:     General: Skin is warm and dry.      Capillary Refill: Capillary refill takes less than 2 seconds.   Neurological:      Mental Status: She is alert.   Psychiatric:         Mood and Affect: Mood normal.         Vital Signs  ED Triage Vitals [09/09/24 1014]   Temperature Pulse Respirations Blood Pressure SpO2   98.7 °F (37.1 °C) 95 18 116/75 99 %      Temp Source Heart Rate Source Patient Position - Orthostatic VS BP Location FiO2 (%)   Oral Monitor Sitting Right arm --      Pain Score       8           Vitals:    09/09/24 1115 09/09/24 1429 09/09/24 1430 09/09/24 1445   BP: 125/63 125/63 110/57    Pulse: 87 85 80 86   Patient Position - Orthostatic VS:  Lying           Visual Acuity      ED Medications  Medications   sodium chloride 0.9 % bolus 1,000 mL (0 mL Intravenous Stopped 9/9/24 1209)   acetaminophen (Ofirmev) injection 1,000 mg (0 mg Intravenous Stopped 9/9/24 1125)   iohexol (OMNIPAQUE) 350 MG/ML injection (MULTI-DOSE) 100 mL (100 mL Intravenous Given 9/9/24 1122)   amoxicillin (AMOXIL) capsule 1,000 mg (1,000 mg Oral Given 9/9/24 1500)       Diagnostic Studies  Results Reviewed       Procedure Component Value Units Date/Time    Magnesium [[id number]]  (Normal) Collected: 09/09/24 1030    Lab Status: Final result Specimen: Blood from Arm, Right Updated: 09/09/24 1150     Magnesium 1.9 mg/dL     Lactic acid, plasma (w/reflex if result > 2.0) [[id number]]  (Normal) Collected: 09/09/24 1110    Lab Status: Final result Specimen: Blood from Arm, Left Updated: 09/09/24 1148     LACTIC ACID 0.8 mmol/L      Narrative:      Result may be elevated if tourniquet was used during collection.    Urine Microscopic [[id number]]  (Abnormal) Collected: 09/09/24 1110    Lab Status: Final result Specimen: Urine, Clean Catch Updated: 09/09/24 1126     RBC, UA 0-1 /hpf      WBC, UA 0-1 /hpf      Epithelial Cells Occasional /hpf      Bacteria, UA Occasional /hpf      MUCUS THREADS Innumerable    UA w Reflex to Microscopic w Reflex to Culture [[id number]]  (Abnormal) Collected: 09/09/24 1110    Lab Status: Final result Specimen: Urine, Clean Catch Updated: 09/09/24 1119     Color, UA Yellow     Clarity, UA Slightly Cloudy     Specific Gravity, UA >=1.030     pH, UA 6.0     Leukocytes, UA Negative     Nitrite, UA Negative     Protein, UA 30 (1+) mg/dl      Glucose, UA Negative mg/dl      Ketones, UA Trace mg/dl      Urobilinogen, UA <2.0 mg/dl      Bilirubin, UA Negative     Occult Blood, UA Moderate    FLU/RSV/COVID - if FLU/RSV clinically relevant [[id number]]  (Normal) Collected: 09/09/24 1030    Lab Status: Final result Specimen: Nares from Nose Updated: 09/09/24 1117     SARS-CoV-2 Negative     INFLUENZA A PCR Negative     INFLUENZA B PCR Negative     RSV PCR Negative    Narrative:      This test has been performed using the CoV-2/Flu/RSV plus assay on the [employer] GeneXpert platform. This test has been validated by the  and verified by the performing laboratory.     This test is designed to amplify and detect the following: nucleocapsid (N), envelope (E), and RNA-dependent RNA polymerase (RdRP) genes of the SARS-CoV-2 genome; matrix (M), basic polymerase (PB2), and acidic protein (PA) segments of the influenza A genome; matrix (M) and non-structural protein (NS) segments of the influenza B genome, and the nucleocapsid genes of RSV A and RSV B.     Positive results are indicative of the presence of Flu A, Flu B, RSV, and/or SARS-CoV-2 RNA. Positive results for SARS-CoV-2 or suspected novel influenza should be reported  to state, local, or federal health departments according to local reporting requirements.      All results should be assessed in conjunction with clinical presentation and other laboratory markers for clinical management.     FOR PEDIATRIC PATIENTS - copy/paste COVID Guidelines URL to browser: [email or web address]       Comprehensive metabolic panel [[id number]]  (Abnormal) Collected: 09/09/24 1030    Lab Status: Final result Specimen: Blood from Arm, Right Updated: 09/09/24 1111     Sodium 136 mmol/L      Potassium 3.4 mmol/L      Chloride 101 mmol/L      CO2 28 mmol/L      ANION GAP 7 mmol/L      BUN 12 mg/dL      Creatinine 0.74 mg/dL      Glucose 99 mg/dL      Calcium 9.2 mg/dL      AST 15 U/L      ALT 12 U/L      Alkaline Phosphatase 60 U/L      Total Protein 7.4 g/dL      Albumin 4.2 g/dL      Total Bilirubin 1.12 mg/dL      eGFR 103 ml/min/1.73sq m     Narrative:      National Kidney Disease Foundation guidelines for Chronic Kidney Disease (CKD):     Stage 1 with normal or high GFR (GFR > 90 mL/min/1.73 square meters)    Stage 2 Mild CKD (GFR = 60-89 mL/min/1.73 square meters)    Stage 3A Moderate CKD (GFR = 45-59 mL/min/1.73 square meters)    Stage 3B Moderate CKD (GFR = 30-44 mL/min/1.73 square meters)    Stage 4 Severe CKD (GFR = 15-29 mL/min/1.73 square meters)    Stage 5 End Stage CKD (GFR <15 mL/min/1.73 square meters)  Note: GFR calculation is accurate only with a steady state creatinine    Lipase [[id number]]  (Abnormal) Collected: 09/09/24 1030    Lab Status: Final result Specimen: Blood from Arm, Right Updated: 09/09/24 1111     Lipase 6 u/L     POCT pregnancy, urine [[id number]]  (Normal) Resulted: 09/09/24 1055    Lab Status: Final result Updated: 09/09/24 1055     EXT Preg Test, Ur Negative     Control Valid    CBC and differential [[id number]]  (Abnormal) Collected: 09/09/24 1030    Lab Status: Final result Specimen: Blood from Arm, Right  Updated: 09/09/24 1036     WBC 22.28 Thousand/uL      RBC 4.48 Million/uL      Hemoglobin 13.5 g/dL      Hematocrit 39.7 %      MCV 89 fL      MCH 30.1 pg      MCHC 34.0 g/dL      RDW 13.2 %      MPV 10.0 fL      Platelets 258 Thousands/uL      nRBC 0 /100 WBCs      Segmented % 78 %      Immature Grans % 1 %      Lymphocytes % 15 %      Monocytes % 6 %      Eosinophils Relative 0 %      Basophils Relative 0 %      Absolute Neutrophils 17.45 Thousands/µL      Absolute Immature Grans 0.14 Thousand/uL      Absolute Lymphocytes 3.34 Thousands/µL      Absolute Monocytes 1.28 Thousand/µL      Eosinophils Absolute 0.01 Thousand/µL      Basophils Absolute 0.06 Thousands/µL                    CT abdomen pelvis with contrast   Final Result by Xenia Swan MD (09/09 1150)      Consolidation right lower lobe with associated minimal branching patchy densities left lower lobe, compatible with pneumonia      Appendix is borderline enlarged however there are no other secondary signs of acute appendicitis. Correlate clinically if concern for acute appendicitis      No fluid collection seen   No bowel obstruction   The study was marked in EPIC for immediate notification.         Workstation performed: [id number]         XR chest 2 views   Final Result by Xenia Swan MD (09/09 1150)   Opacity right lower lung posteriorly, compatible with pneumonia         Follow-up suggested in 6 to 8 weeks to demonstrate resolution      Workstation performed: [id number]                    Procedures  Procedures         ED Course  ED Course as of 09/09/24 1735   Mon Sep 09, 2024   1242 Surg consult placed   1428 Patient seen by surgery LISSA, will treat PNA at the time and give strict return precautions                                 SBIRT 22yo+      Flowsheet Row Most Recent Value   Initial Alcohol Screen: US AUDIT-C     1. How often do you have a drink containing alcohol? 0 Filed at: 09/09/2024 1017   2. How many drinks containing alcohol  do you have on a typical day you are drinking?  0 Filed at: 09/09/2024 1017   3b. FEMALE Any Age, or MALE 65+: How often do you have 4 or more drinks on one occassion? 0 Filed at: 09/09/2024 1017   Audit-C Score 0 Filed at: 09/09/2024 1017   CLAY: How many times in the past year have you...    Used an illegal drug or used a prescription medication for non-medical reasons? Never Filed at: 09/09/2024 1017                      Medical Decision Making  Patient overall well-appearing, afebrile, SpO2 97% on RA.  Abdomen soft, nondistended.  Will order CMP to check for electrolyte dysfunction, CEFERINO, markers for dehydration. CBC for leukocytosis, severe anemia or marked thrombocytopenia. Magnesium for electrolyte dysfunction. Flu/COVID/RSV swab. CXR to evaluate for cardiopulmonary process, such as but not limited to, pneumonia, pleural effusion, etc. UA and urine pregnancy. Lipase, lactic and CT abdomen pelvis to assess for abdominal etiology.    Chest x-ray with findings suggesting pneumonia. Leukocytosis likely related to PNA and right abdominal discomfort likely related to significant PNA and irritation of diaphragm.  Patient also evaluated by surgery PA-C at bedside due to findings of appendix being borderline enlarged however no other secondary signs of acute appendicitis. Will prescribe antibiotics to treat pneumonia and provide strict return precautions. Discussed with patient that a follow-up chest x-ray in 6 to 8 weeks is recommended to ensure resolution of pneumonia.    Other findings as noted above. Patient provided with first dose of antibiotic in the ED. Patient verbalized understanding of return precautions and is agreeable to follow-up with primary care provider. Patient discharged in no acute distress.    Amount and/or Complexity of Data Reviewed  Labs: ordered.  Radiology: ordered.    Risk  Prescription drug management.                 Disposition  Final diagnoses:   Abnormal CT of the abdomen   Pneumonia    Leukocytosis     Time reflects when diagnosis was documented in both MDM as applicable and the Disposition within this note       Time User Action Codes Description Comment    9/9/2024 12:36 PM Felicia Paul Add [R93.5] Abnormal CT of the abdomen     9/9/2024  2:52 PM Felicia Paul Add [J18.9] Pneumonia     9/9/2024  2:52 PM Felicia Paul Modify [R93.5] Abnormal CT of the abdomen     9/9/2024  2:52 PM Felicia Paul Modify [J18.9] Pneumonia     9/9/2024  2:52 PM Felicia Paul Add [D72.829] Leukocytosis           ED Disposition       ED Disposition   Discharge    Condition   Stable    Date/Time   Mon Sep 9, 2024 1452    Comment   Antonietta Abbott discharge to home/self care.                   Follow-up Information    None         Discharge Medication List as of 9/9/2024  2:58 PM        START taking these medications    Details   amoxicillin (AMOXIL) 500 mg capsule Take 2 capsules (1,000 mg total) by mouth 3 (three) times a day for 5 days, Starting Mon 9/9/2024, Until Sat 9/14/2024, Normal           CONTINUE these medications which have NOT CHANGED    Details   aspirin (ECOTRIN LOW STRENGTH) 81 mg EC tablet Take 2 tablets (162 mg total) by mouth daily, Starting Mon 11/23/2020, Normal      Multiple Vitamins-Minerals (ONE A DAY IMMUNITY DEFENSE PO) as directed Orally, Historical Med      predniSONE 10 mg tablet 4 tabs po qd x 2 days then 3 tabs po qd x 2 days then 2 tabs po qd x 2 days then 1 tab po qd x 2 days, Normal      Prenatal Vit-Fe Fumarate-FA (prenatal multivitamin) 28-0.8 MG TABS Take by mouth every 24 hours, Historical Med             No discharge procedures on file.    PDMP Review       None            ED Provider  Electronically Signed by             Felicia Paul PA-C  09/09/24 7344

## 2024-09-09 NOTE — DISCHARGE INSTRUCTIONS
Please follow-up with your primary care provider in regards to today's visit.  Please note that follow-up chest xray is suggested in 6 to 8 weeks to demonstrate resolution of the pneumonia.  Please take the antibiotic that has been sent to your pharmacy.  Additionally, CT abdomen pelvis with contrast showed your appendix to be borderline enlarged however there are no other secondary signs of acute appendicitis.     Return to the Emergency Department if you experience worsening or uncontrolled pain, fevers 100.4°F or greater, recurrent vomiting, inability to tolerate food or fluids by mouth, bloody stools or vomit, black or tarry stools, or any other concerning symptoms.    Return to the Emergency Department if you experience worsening or uncontrolled chest pain, shortness of breath, light headedness, feeling faint, nausea, vomiting, or any other concerning symptoms.

## 2024-09-25 ENCOUNTER — OFFICE VISIT (OUTPATIENT)
Dept: GASTROENTEROLOGY | Facility: CLINIC | Age: 39
End: 2024-09-25
Payer: COMMERCIAL

## 2024-09-25 VITALS
BODY MASS INDEX: 24.17 KG/M2 | WEIGHT: 150.4 LBS | SYSTOLIC BLOOD PRESSURE: 100 MMHG | HEIGHT: 66 IN | DIASTOLIC BLOOD PRESSURE: 57 MMHG

## 2024-09-25 DIAGNOSIS — K64.8 INTERNAL HEMORRHOID, BLEEDING: Primary | ICD-10-CM

## 2024-09-25 PROCEDURE — 46221 LIGATION OF HEMORRHOID(S): CPT | Performed by: INTERNAL MEDICINE

## 2024-09-25 NOTE — PROGRESS NOTES
[facility] - Hemorrhoid Banding Antonietta Abbott 39 y.o. female MRN: [id number]  Encounter: [id number]    ASSESSMENT AND PLAN:    The left lateral hemorrhoid area was banded today. The patient was instructed to avoid constipation and straining, and educated in appropriate fiber intake.     HPI: Antonietta Abbott is a 39 y.o. year old female who presents with rectal bleeding due to hemorrhoids.  Hemorrhoids were identified on recent colonoscopy.  She was seen in the [facility] emergency department 9/9 with a cough and was diagnosed with pneumonia, now on amoxicillin with some mild loose stools    Previous treatments include: OTC  Bands were previously placed: N/A  Current Fiber intake: Dietary      The patient provided consent for banding  and was placed in the left lateral position  Rectal exam showed grade 3 internal hemorrhoids all 3 areas, largest left lateral  The [place] ligator was advanced and a band was applied without difficulty   Repeat rectal exam confirmed appropriate placement  The patient was discharged home after observation in the waiting area  The exam was chaperoned by AUGUSTO Fuchs

## 2025-07-19 DIAGNOSIS — Z00.6 ENCOUNTER FOR EXAMINATION FOR NORMAL COMPARISON OR CONTROL IN CLINICAL RESEARCH PROGRAM: ICD-10-CM
